# Patient Record
Sex: FEMALE | Race: BLACK OR AFRICAN AMERICAN | NOT HISPANIC OR LATINO | Employment: OTHER | URBAN - METROPOLITAN AREA
[De-identification: names, ages, dates, MRNs, and addresses within clinical notes are randomized per-mention and may not be internally consistent; named-entity substitution may affect disease eponyms.]

---

## 2017-03-22 ENCOUNTER — HOSPITAL ENCOUNTER (EMERGENCY)
Facility: HOSPITAL | Age: 77
Discharge: HOME | End: 2017-03-23
Attending: EMERGENCY MEDICINE
Payer: MEDICARE

## 2017-03-22 DIAGNOSIS — Z45.2 ENCOUNTER FOR ASSESSMENT OF PERIPHERALLY INSERTED CENTRAL VENOUS CATHETER (PICC): ICD-10-CM

## 2017-03-22 DIAGNOSIS — I50.9 CONGESTIVE HEART FAILURE, UNSPECIFIED CONGESTIVE HEART FAILURE CHRONICITY, UNSPECIFIED CONGESTIVE HEART FAILURE TYPE: Primary | ICD-10-CM

## 2017-03-22 DIAGNOSIS — Z45.2 NEEDS PERIPHERALLY INSERTED CENTRAL CATHETER (PICC): ICD-10-CM

## 2017-03-22 PROCEDURE — 99283 EMERGENCY DEPT VISIT LOW MDM: CPT | Mod: 25

## 2017-03-22 PROCEDURE — 36569 INSJ PICC 5 YR+ W/O IMAGING: CPT

## 2017-03-22 RX ORDER — BENZONATATE 100 MG/1
100 CAPSULE ORAL DAILY
COMMUNITY

## 2017-03-22 RX ORDER — ALLOPURINOL 100 MG/1
100 TABLET ORAL 2 TIMES DAILY
COMMUNITY

## 2017-03-22 RX ORDER — HYDROCODONE BITARTRATE AND ACETAMINOPHEN 7.5; 325 MG/1; MG/1
1 TABLET ORAL EVERY 6 HOURS PRN
Status: ON HOLD | COMMUNITY
End: 2017-05-27 | Stop reason: HOSPADM

## 2017-03-22 RX ORDER — BUMETANIDE 1 MG/1
2 TABLET ORAL 2 TIMES DAILY
COMMUNITY

## 2017-03-22 NOTE — ED AVS SNAPSHOT
[facility]  [address]  [place] [address]               Vita Field   3/22/2017 10:40 PM   ED    Description:  Female : 1940   Department:  [facility]            Your Care was Coordinated By:     Provider Role From To    Cruz Vang Jr., MD Attending Provider [id number] --      Reason for Visit     Vascular Access Problem           Diagnoses this Visit        Comments    Congestive heart failure, unspecified congestive heart failure chronicity, unspecified congestive heart failure type    -  Primary     Needs peripherally inserted central catheter (PICC)           ED Disposition     None           To Do List           Follow-up Information     Schedule an appointment as soon as possible for a visit with Tabatha Jacques MD.    Specialty:  Family Medicine    Why:  As needed    Contact information:    [address]   [address]  [facility] [address] [phone]        [facility]     [facility] -  Assistance  Registered nurses in the [facility] provide clinical advisement, health education, appointment booking, and other advisory services.  Call for this free service at [phone].             Medications           Message regarding Medications     Verify the changes and/or additions to your medication regime listed below are the same as discussed with your clinician today.  If any of these changes or additions are incorrect, please notify your healthcare provider.             Verify that the below list of medications is an accurate representation of the medications you are currently taking.  If none reported, the list may be blank. If incorrect, please contact your healthcare provider. Carry this list with you in case of emergency.           Current Medications     allopurinol (ZYLOPRIM) 100 MG tablet Take 100 mg by mouth 2 (two) times daily.    ascorbic acid (VITAMIN C) 500 MG tablet  Take 1 tablet by mouth Daily.    benzonatate (TESSALON) 100 MG capsule Take 100 mg by mouth once daily.    blood sugar diagnostic (BLOOD GLUCOSE TEST) Strp by Misc.(Non-Drug; Combo Route) route 3 (three) times daily. ONE TOUCH TRUE STRIPS    blood-glucose meter Misc Use to take blood sugar    bumetanide (BUMEX) 1 MG tablet Take 2 mg by mouth 2 (two) times daily.    calcium carbonate (OS-ALEX) 600 mg (1,500 mg) Tab Take 600 mg by mouth.    DEXILANT 60 mg capsule     hydrocodone-acetaminophen 7.5-325mg (NORCO) 7.5-325 mg per tablet Take 1 tablet by mouth every 6 (six) hours as needed for Pain.    iron-vitamin C (FE C) 100-250 mg Tab Take 1 tablet by mouth Daily.    lancets (ACCU-CHEK SOFTCLIX LANCETS) Misc Stick finger to apply blood to test strip to check blood sugar 3 times daily.    latanoprost 0.005 % ophthalmic solution INSTILL 1 DROP INTOP BOTH EYES EVERY NIGHT AT BEDTIME    leflunomide (ARAVA) 20 MG tablet Take 1 tablet by mouth Daily. generic    metoprolol succinate (TOPROL-XL) 25 MG 24 hr tablet Take 12.5 mg by mouth 2 (two) times daily.     multivitamin with minerals tablet Take 1 tablet by mouth.    rosuvastatin (CRESTOR) 10 MG tablet Take 1 tablet by mouth Daily.    TRUERESULT BLOOD GLUCOSE SYSTM kit     warfarin (COUMADIN) 5 MG tablet Take 2.5 tablets by mouth every evening.     ciprofloxacin HCl (CIPRO) 500 MG tablet     clindamycin (CLEOCIN) 300 MG capsule     fish oil-omega-3 fatty acids 300-1,000 mg capsule Take 1 g by mouth.    furosemide (LASIX) 40 MG tablet Take 1 tablet by mouth Daily.    hydrocodone-acetaminophen (LORTAB)  mg per tablet Take by mouth Twice daily.    hydrocodone-acetaminophen 5-325mg (NORCO) 5-325 mg per tablet     insulin detemir (LEVEMIR) 100 unit/mL injection Inject into the skin. 10-15 in the am    insulin detemir (LEVEMIR) 100 unit/mL injection Inject 24 Units into the skin.    insulin NPH-insulin regular (NOVOLIN 70/30) 100 unit/mL (70-30) injection Inject into the  "skin. 10-12 units over 400    insulin syringe-needle U-100 1 mL 31 x 5/16" Syrg     isosorbide-hydrALAZINE 20-37.5 mg (BIDIL) 20-37.5 mg Tab Take 1 tablet by mouth once daily.    KLOR-CON 10 10 mEq TbSR     tramadol (ULTRAM) 50 mg tablet     ZOVIRAX 5 % Crea            Clinical Reference Information           Your Vitals Were     BP Pulse Temp Resp Weight SpO2    118/65 (BP Location: Right arm, Patient Position: Sitting) 89 98.2 °F (36.8 °C) (Oral) 18 54.9 kg (121 lb) 95%    BMI                22.13 kg/m2          Allergies as of 3/22/2017        Reactions    Fish Containing Products Hives    Percocet [Oxycodone-acetaminophen] Hives, Rash    Shellfish Containing Products Hives    Darvocet A500  [Propoxyphene N-acetaminophen]     Other reaction(s): Rash    Ibuprofen Hives    Other reaction(s): Hives    Iodine     Other reaction(s): rash    Lanolin     Other reaction(s): rash    Latex Hives    Other reaction(s): rash    Other     fragrance    Saxagliptin Other (See Comments)    Lowers calcium level     Penicillins Rash    SEVERE FACIAL SWELLING, EYES SWELLED SHUT.    Propoxyphene-acetaminophen Rash    Red Dye Rash      Immunizations Administered on Date of Encounter - 3/22/2017     None      ED Micro, Lab, POCT     None      ED Imaging Orders     None        Discharge Instructions         Flushing Your PICC Line at Home  Your peripherally inserted central catheter (PICC) line is used to deliver medicine or feedings. Its a long, flexible tube (catheter) that goes into your vein. To care for your PICC line, you will need to flush it. This means youll need to clean it with a solution as directed by your healthcare provider. This keeps it from getting clogged or blocked. A clogged or blocked PICC line will need to be taken out and replaced.  When to flush your PICC line  Youll need to flush your PICC line as often as directed by your healthcare provider. You may need to flush it after each use. If the PICC line is not " in active use, you may need to flush it once a day. Or you may only need to flush it once a week. Talk with your healthcare provider about how often you should do this.  What youll need  · Flushing solution. This is the liquid that you will send through the PICC line. Your healthcare provider will tell you what kind to use. In most cases, it is saline solution. This is a sterile mix of water and a tiny amount of salt. Your healthcare provider will also tell you how much to use. You may also need to flush with a heparin solution after the saline. Heparin is a medicine that thins the blood. It helps prevent blood from clotting in and around the catheter.  · A syringe. This is the device used to give an injection, or shot. A syringe is used to flush your PICC line with the solution. You will probably use prefilled syringes.  · Alcohol wipes or rubbing alcohol and cotton balls. Youll use these to clean some of the tools used to flush your line. This helps to prevent germs from going into your PICC line.  How to flush your PICC line  Repeat these steps as often as your healthcare provider has instructed:  Step 1. Wash your hands  · Wash your hands well with soap and warm water.  · If you dont have access to soap and water, use an alcohol-based hand gel. The gel should have at least 60% alcohol.  · Only touch your PICC line with clean hands, or when wearing clean gloves. This is to protect you from infection.  Step 2. Fill the syringe  · Open a new bottle of the flushing solution. If youre using a bottle thats already open, use the alcohol to clean the top of the bottle.  · Remove the cap from the needle or tip of the syringe. Push the plunger of the syringe down all the way.  · Put the needle or tip of the syringe into the flushing solution.  · Pull the syringe plunger out. Stop when you have the right amount of flushing solution in the syringe. Your healthcare provider will tell you how much to use.  Step 3. Remove  air from the syringe  · Hold the syringe with the tip pointing up.  · Flick or tap the syringe with your finger. This will cause any large air bubbles to rise into the tip.  · Slowly push on the plunger until a tiny drop of flushing solution comes out of the needle or tip.  · Put the cap back on the needle or tip of the syringe. This will keep it germ-free until you use it.  Step 4. Inject the flushing solution  · Wipe the port with alcohol.  · Open the clamp, if there is one.  · Take the cap off the needle or tip of the syringe. Insert the needle or tip into the port.  · Push the plunger in slowly and smoothly. Dont force the plunger. You shouldnt feel any pressure when you push the fluid into the PICC line. If you do, stop right away.  Step 5. Finish flushing  · If there is a clamp, close it just before the syringe is empty. This stops blood from flowing back into the catheter.  · Remove the needle or tip of the syringe from the port.  · Put the syringe into a special container (sharps container).  When to call your healthcare provider  Call your healthcare provider right away if you have:  · Fever of 100.4°F (38°C) or higher  · Swelling, redness, drainage, or pain around the PICC site  · Bleeding from the PICC site  · Tubing that leaks or is pulling out  · Feel new resistance when flushing the PICC line, or can't flush at all  · Medicine or fluids that do not drain from the bag into your PICC   Date Last Reviewed: 7/1/2016  © 4050-1496 [employer] [address] [place] [address], [place] [address]. All rights reserved. This information is not intended as a substitute for professional medical care. Always follow your healthcare professional's instructions.          [facility] Sign-Up     Activating your [facility] account is as easy as 1-2-3!     1) Visit [email or web address], select Sign Up Now, enter this activation code and your date of birth, then select Next.  [email or web address]  Expires: 5/6/2017 11:58 PM       2) Create a username and password to use when you visit [facility] in the future and select a security question in case you lose your password and select Next.    3) Enter your e-mail address and click Sign Up!    Additional Information  If you have questions, please e-mail [email or web address] or call [phone] to talk to our [email or web address] staff. Remember, [employer] [facility] is NOT to be used for urgent needs. For medical emergencies, dial 911.          [facility]  complies with applicable Federal civil rights laws and does not discriminate on the basis of race, color, national origin, age, disability, or sex.        Language Assistance Services     ATTENTION: Language assistance services are available, free of charge. Please call [phone].      ATENCIÓN: Si habla español, tiene a jarquin disposición servicios gratuitos de asistencia lingüística. Llame al [phone].     CHÚ Ý: N?u b?n nói Ti?ng Vi?t, có các d?ch v? h? tr? ngôn ng? mi?n phí dành cho b?n. G?i s? [phone].

## 2017-03-23 VITALS
RESPIRATION RATE: 18 BRPM | WEIGHT: 121 LBS | HEART RATE: 87 BPM | BODY MASS INDEX: 22.13 KG/M2 | OXYGEN SATURATION: 98 % | TEMPERATURE: 98 F | SYSTOLIC BLOOD PRESSURE: 109 MMHG | DIASTOLIC BLOOD PRESSURE: 61 MMHG

## 2017-03-23 PROCEDURE — 36569 INSJ PICC 5 YR+ W/O IMAGING: CPT

## 2017-03-23 NOTE — ED PROVIDER NOTES
SCRIBE #1 NOTE: I, Filiberto Mtz, am scribing for, and in the presence of, Cruz Vang Jr., MD. I have scribed the entire note.      History      Chief Complaint   Patient presents with    Vascular Access Problem     pt pulled PICC line out around 7pm; pt on Dobutamine        Review of patient's allergies indicates:   Allergen Reactions    Fish containing products Hives    Percocet [oxycodone-acetaminophen] Hives and Rash    Shellfish containing products Hives    Darvocet a500  [propoxyphene n-acetaminophen]      Other reaction(s): Rash    Ibuprofen Hives     Other reaction(s): Hives    Iodine      Other reaction(s): rash    Lanolin      Other reaction(s): rash    Latex Hives     Other reaction(s): rash    Other      fragrance    Saxagliptin Other (See Comments)     Lowers calcium level     Penicillins Rash     SEVERE FACIAL SWELLING, EYES SWELLED SHUT.    Propoxyphene-acetaminophen Rash    Red dye Rash        HPI   HPI    3/22/2017, 11:01 PM   History obtained from the patient      History of Present Illness: Vita Field is a 76 y.o. female patient who presents to the Emergency Department for an evaluation of a vascular access problem which onset suddenly today. Symptoms are constant and moderate in severity. Exacerbated by nothing and relieved by nothing. Patient denies any fever chills, congestion, sore throat, HA, dizziness, weakness, lightheadedness, numbness, abd pain, nausea, vomiting, SOB, CP, dysuria, hematuria, back pain, neck pain, and all other sxs at this time. Pt reportedly pulled out her PICC line around 1900 today while drying off after a shower. Pt is reportedly on dobutamine. No further complaints or concerns at this time.     Arrival mode: Personal vehicle    PCP: Tabatha Jacques MD       Past Medical History:  Past Medical History:   Diagnosis Date    Arthritis     Cataract     CHF (congestive heart failure)     Diabetes mellitus     Diabetic retinopathy     Glaucoma      H/O blood clots     Heart disease     Hypertension     Kidney disease     Osteoporosis     Rheumatoid arthritis     Rheumatoid arthritis     Vertigo        Past Surgical History:  Past Surgical History:   Procedure Laterality Date    CARDIAC PACEMAKER PLACEMENT      CATARACT EXTRACTION      CATARACT EXTRACTION W/  INTRAOCULAR LENS IMPLANT Left 12-29-14    Canaloplasty    HYSTERECTOMY      TOE AMPUTATION Right     TOTAL KNEE ARTHROPLASTY           Family History:  Family History   Problem Relation Age of Onset    Diabetes Mother     Diabetes Sister     Glaucoma Sister     Diabetes Brother     Glaucoma Brother     Blindness Brother        Social History:  Social History     Social History Main Topics    Smoking status: Never Smoker    Smokeless tobacco: Never Used    Alcohol use Unknown    Drug use: Unknown    Sexual activity: Unknown       ROS   Review of Systems   Constitutional: Negative for chills and fever.   HENT: Negative for congestion, sore throat and trouble swallowing.    Respiratory: Negative for shortness of breath.    Cardiovascular: Negative for chest pain.   Gastrointestinal: Negative for abdominal pain, nausea and vomiting.   Genitourinary: Negative for dysuria and hematuria.   Musculoskeletal: Negative for back pain and neck pain.   Skin: Negative for rash.   Neurological: Negative for dizziness, weakness, light-headedness, numbness and headaches.   Hematological: Does not bruise/bleed easily.       Physical Exam    Initial Vitals   BP Pulse Resp Temp SpO2   03/22/17 2238 03/22/17 2238 03/22/17 2238 03/22/17 2238 03/22/17 2238   118/65 89 18 98.2 °F (36.8 °C) 95 %      Physical Exam  Nursing Notes and Vital Signs Reviewed.  Constitutional: Patient is in no acute distress. Awake and alert. Well-developed and well-nourished.  Head: Atraumatic. Normocephalic.  Eyes: PERRL. EOM intact. Conjunctivae are not pale. No scleral icterus.  ENT: Mucous membranes are moist. Oropharynx  is clear and symmetric.    Neck: Supple. Full ROM. No lymphadenopathy.  Cardiovascular: Regular rate. Regular rhythm. No murmurs, rubs, or gallops. Distal pulses are 2+ and symmetric.  Pulmonary/Chest: No respiratory distress. Clear to auscultation bilaterally. No wheezing, rales, or rhonchi.  Abdominal: Soft and non-distended.  There is no tenderness.  No rebound, guarding, or rigidity. Good bowel sounds.  Genitourinary: No CVA tenderness  Musculoskeletal: Moves all extremities. No obvious deformities. No edema. No calf tenderness. Dressing noted on left arm where previous gas cath was located.   Skin: Warm and dry.  Neurological:  Alert, awake, and appropriate.  Normal speech.  No acute focal neurological deficits are appreciated.  Psychiatric: Normal affect. Good eye contact. Appropriate in content.    ED Course    Procedures  ED Vital Signs:  Vitals:    03/22/17 2238   BP: 118/65   Pulse: 89   Resp: 18   Temp: 98.2 °F (36.8 °C)   TempSrc: Oral   SpO2: 95%   Weight: 54.9 kg (121 lb)              The Emergency Provider reviewed the vital signs and test results, which are outlined above.    ED Discussion       12:04 PM: Reassessed pt at this time. Pt is awake, alert, and in NAD. Pt states her condition has improved at this time. Discussed with pt all pertinent ED information. Discussed pt dx of CHF and plan of tx. Gave pt all f/u and return to the ED instructions. All questions and concerns were addressed at this time. Pt expresses understanding of information and instructions, and is comfortable with plan to discharge. Pt is stable for discharge.    I discussed with patient and/or family/caretaker that evaluation in the ED does not suggest any emergent or life threatening medical conditions requiring immediate intervention beyond what was provided in the ED, and I believe patient is safe for discharge.  Regardless, an unremarkable evaluation in the ED does not preclude the development or presence of a serious of  life threatening condition. As such, patient was instructed to return immediately for any worsening or change in current symptoms.   100 AM- PICC line nurse came in and replaced PICC line wihtout any difficulty - pt discharged after xray dine to verify line placement and , xray reviewed by me     ED Medication(s):  Medications - No data to display    New Prescriptions    No medications on file       Follow-up Information     Schedule an appointment as soon as possible for a visit with Tabatha Jacques MD.    Specialty:  Family Medicine    Why:  As needed    Contact information:    [address]   [address]  [place] [address] [phone]              Medical Decision Making              Scribe Attestation:   Scribe #1: I performed the above scribed service and the documentation accurately describes the services I performed. I attest to the accuracy of the note.    Attending:   Physician Attestation Statement for Scribe #1: I, Cruz Vang Jr., MD, personally performed the services described in this documentation, as scribed by Filiberto Mtz, in my presence, and it is both accurate and complete.          Clinical Impression       ICD-10-CM ICD-9-CM   1. Congestive heart failure, unspecified congestive heart failure chronicity, unspecified congestive heart failure type I50.9 428.0   2. Needs peripherally inserted central catheter (PICC) Z45.2 V58.81       Disposition:   Disposition: Discharged  Condition: Stable         Cruz Vang Jr., MD  03/23/17 0102

## 2017-03-23 NOTE — PROCEDURES
Vita Field is a 76 y.o. female patient.    Temp: 98.1 °F (36.7 °C) (03/23/17 0040)  Pulse: 89 (03/23/17 0040)  Resp: 18 (03/23/17 0040)  BP: (!) 107/57 (03/23/17 0040)  SpO2: 99 % (03/23/17 0040)  Weight: 54.9 kg (121 lb) (03/22/17 2238)    PICC  Date/Time: 3/23/2017 12:45 AM  Location procedure was performed: [facility] PICC LINE PLACEMENT  Performed by: JASMIN MARI  Consent Done: Yes  Time out: Immediately prior to procedure a time out was called to verify the correct patient, procedure, equipment, support staff and site/side marked as required  Indications: med administration  Anesthesia: local infiltration  Local anesthetic: lidocaine 1% without epinephrine  Anesthetic Total (mL): 5  Preparation: skin prepped with ChloraPrep  Skin prep agent dried: skin prep agent completely dried prior to procedure  Sterile barriers: all five maximum sterile barriers used - cap, mask, sterile gown, sterile gloves, and large sterile sheet  Hand hygiene: hand hygiene performed prior to central venous catheter insertion  Location details: right basilic  Catheter type: double lumen  Catheter size: 5 Fr  Catheter Length: 35cm    Ultrasound guidance: yes  Vessel Caliber: medium and patent, compressibility normal  Vascular Doppler: not done  Needle advanced into vessel with real time Ultrasound guidance.  Guidewire confirmed in vessel.  Sterile sheath used.  no esophageal manometryNumber of attempts: 1  Post-procedure: blood return through all ports, chlorhexidine patch and sterile dressing applied  Estimated blood loss (mL): 0  Specimens: No  Implants: No  Assessment: placement verified by x-ray and successful placement  Complications: none        Jasmin Mari  3/23/2017

## 2017-03-23 NOTE — DISCHARGE INSTRUCTIONS
Flushing Your PICC Line at Home  Your peripherally inserted central catheter (PICC) line is used to deliver medicine or feedings. Its a long, flexible tube (catheter) that goes into your vein. To care for your PICC line, you will need to flush it. This means youll need to clean it with a solution as directed by your healthcare provider. This keeps it from getting clogged or blocked. A clogged or blocked PICC line will need to be taken out and replaced.  When to flush your PICC line  Youll need to flush your PICC line as often as directed by your healthcare provider. You may need to flush it after each use. If the PICC line is not in active use, you may need to flush it once a day. Or you may only need to flush it once a week. Talk with your healthcare provider about how often you should do this.  What youll need  · Flushing solution. This is the liquid that you will send through the PICC line. Your healthcare provider will tell you what kind to use. In most cases, it is saline solution. This is a sterile mix of water and a tiny amount of salt. Your healthcare provider will also tell you how much to use. You may also need to flush with a heparin solution after the saline. Heparin is a medicine that thins the blood. It helps prevent blood from clotting in and around the catheter.  · A syringe. This is the device used to give an injection, or shot. A syringe is used to flush your PICC line with the solution. You will probably use prefilled syringes.  · Alcohol wipes or rubbing alcohol and cotton balls. Youll use these to clean some of the tools used to flush your line. This helps to prevent germs from going into your PICC line.  How to flush your PICC line  Repeat these steps as often as your healthcare provider has instructed:  Step 1. Wash your hands  · Wash your hands well with soap and warm water.  · If you dont have access to soap and water, use an alcohol-based hand gel. The gel should have at least 60%  alcohol.  · Only touch your PICC line with clean hands, or when wearing clean gloves. This is to protect you from infection.  Step 2. Fill the syringe  · Open a new bottle of the flushing solution. If youre using a bottle thats already open, use the alcohol to clean the top of the bottle.  · Remove the cap from the needle or tip of the syringe. Push the plunger of the syringe down all the way.  · Put the needle or tip of the syringe into the flushing solution.  · Pull the syringe plunger out. Stop when you have the right amount of flushing solution in the syringe. Your healthcare provider will tell you how much to use.  Step 3. Remove air from the syringe  · Hold the syringe with the tip pointing up.  · Flick or tap the syringe with your finger. This will cause any large air bubbles to rise into the tip.  · Slowly push on the plunger until a tiny drop of flushing solution comes out of the needle or tip.  · Put the cap back on the needle or tip of the syringe. This will keep it germ-free until you use it.  Step 4. Inject the flushing solution  · Wipe the port with alcohol.  · Open the clamp, if there is one.  · Take the cap off the needle or tip of the syringe. Insert the needle or tip into the port.  · Push the plunger in slowly and smoothly. Dont force the plunger. You shouldnt feel any pressure when you push the fluid into the PICC line. If you do, stop right away.  Step 5. Finish flushing  · If there is a clamp, close it just before the syringe is empty. This stops blood from flowing back into the catheter.  · Remove the needle or tip of the syringe from the port.  · Put the syringe into a special container (sharps container).  When to call your healthcare provider  Call your healthcare provider right away if you have:  · Fever of 100.4°F (38°C) or higher  · Swelling, redness, drainage, or pain around the PICC site  · Bleeding from the PICC site  · Tubing that leaks or is pulling out  · Feel new resistance  when flushing the PICC line, or can't flush at all  · Medicine or fluids that do not drain from the bag into your PICC   Date Last Reviewed: 7/1/2016  © 6439-5958 The "[employer]. [address] [place] [address], [place] [address]. All rights reserved. This information is not intended as a substitute for professional medical care. Always follow your healthcare professional's instructions.

## 2017-05-24 ENCOUNTER — HOSPITAL ENCOUNTER (INPATIENT)
Facility: HOSPITAL | Age: 77
LOS: 3 days | Discharge: HOME HEALTH | DRG: 291 | End: 2017-05-27
Attending: EMERGENCY MEDICINE | Admitting: INTERNAL MEDICINE
Payer: MEDICARE

## 2017-05-24 DIAGNOSIS — D64.9 ANEMIA, UNSPECIFIED TYPE: ICD-10-CM

## 2017-05-24 DIAGNOSIS — N28.9 RENAL INSUFFICIENCY: ICD-10-CM

## 2017-05-24 DIAGNOSIS — I50.9 CHF, ACUTE ON CHRONIC: ICD-10-CM

## 2017-05-24 DIAGNOSIS — R07.9 CHEST PAIN, UNSPECIFIED TYPE: ICD-10-CM

## 2017-05-24 DIAGNOSIS — I50.9 ACUTE ON CHRONIC CONGESTIVE HEART FAILURE, UNSPECIFIED CONGESTIVE HEART FAILURE TYPE: Primary | ICD-10-CM

## 2017-05-24 LAB
ALBUMIN SERPL BCP-MCNC: 3 G/DL
ALP SERPL-CCNC: 87 U/L
ALT SERPL W/O P-5'-P-CCNC: 15 U/L
ANION GAP SERPL CALC-SCNC: 12 MMOL/L
AST SERPL-CCNC: 44 U/L
BASOPHILS # BLD AUTO: 0.01 K/UL
BASOPHILS NFR BLD: 0.2 %
BILIRUB SERPL-MCNC: 0.9 MG/DL
BNP SERPL-MCNC: 4618 PG/ML
BUN SERPL-MCNC: 62 MG/DL
CALCIUM SERPL-MCNC: 8.6 MG/DL
CHLORIDE SERPL-SCNC: 103 MMOL/L
CO2 SERPL-SCNC: 25 MMOL/L
CREAT SERPL-MCNC: 2.2 MG/DL
DIFFERENTIAL METHOD: ABNORMAL
EOSINOPHIL # BLD AUTO: 0.1 K/UL
EOSINOPHIL NFR BLD: 3.2 %
ERYTHROCYTE [DISTWIDTH] IN BLOOD BY AUTOMATED COUNT: 25.2 %
EST. GFR  (AFRICAN AMERICAN): 24 ML/MIN/1.73 M^2
EST. GFR  (NON AFRICAN AMERICAN): 21 ML/MIN/1.73 M^2
GLUCOSE SERPL-MCNC: 153 MG/DL
HCT VFR BLD AUTO: 24.2 %
HGB BLD-MCNC: 8 G/DL
INR PPP: 5.1
LYMPHOCYTES # BLD AUTO: 1 K/UL
LYMPHOCYTES NFR BLD: 22.7 %
MCH RBC QN AUTO: 26.1 PG
MCHC RBC AUTO-ENTMCNC: 33.1 %
MCV RBC AUTO: 79 FL
MONOCYTES # BLD AUTO: 1.1 K/UL
MONOCYTES NFR BLD: 24.5 %
NEUTROPHILS # BLD AUTO: 2.2 K/UL
NEUTROPHILS NFR BLD: 49.4 %
PLATELET # BLD AUTO: 156 K/UL
PMV BLD AUTO: ABNORMAL FL
POTASSIUM SERPL-SCNC: 3.8 MMOL/L
PROT SERPL-MCNC: 6.6 G/DL
PROTHROMBIN TIME: 49.6 SEC
RBC # BLD AUTO: 3.07 M/UL
SODIUM SERPL-SCNC: 140 MMOL/L
TROPONIN I SERPL DL<=0.01 NG/ML-MCNC: 0.06 NG/ML
WBC # BLD AUTO: 4.37 K/UL

## 2017-05-24 PROCEDURE — 99285 EMERGENCY DEPT VISIT HI MDM: CPT

## 2017-05-24 PROCEDURE — 63600175 PHARM REV CODE 636 W HCPCS: Performed by: EMERGENCY MEDICINE

## 2017-05-24 PROCEDURE — 83880 ASSAY OF NATRIURETIC PEPTIDE: CPT

## 2017-05-24 PROCEDURE — 80053 COMPREHEN METABOLIC PANEL: CPT

## 2017-05-24 PROCEDURE — 84484 ASSAY OF TROPONIN QUANT: CPT

## 2017-05-24 PROCEDURE — 93010 ELECTROCARDIOGRAM REPORT: CPT | Mod: ,,, | Performed by: INTERNAL MEDICINE

## 2017-05-24 PROCEDURE — 85025 COMPLETE CBC W/AUTO DIFF WBC: CPT

## 2017-05-24 PROCEDURE — 11000001 HC ACUTE MED/SURG PRIVATE ROOM

## 2017-05-24 PROCEDURE — 85610 PROTHROMBIN TIME: CPT

## 2017-05-24 PROCEDURE — 82962 GLUCOSE BLOOD TEST: CPT

## 2017-05-24 PROCEDURE — 93005 ELECTROCARDIOGRAM TRACING: CPT

## 2017-05-24 RX ORDER — FUROSEMIDE 10 MG/ML
40 INJECTION INTRAMUSCULAR; INTRAVENOUS
Status: COMPLETED | OUTPATIENT
Start: 2017-05-24 | End: 2017-05-24

## 2017-05-24 RX ORDER — ATORVASTATIN CALCIUM 10 MG/1
10 TABLET, FILM COATED ORAL DAILY
Status: ON HOLD | COMMUNITY
End: 2017-05-27 | Stop reason: HOSPADM

## 2017-05-24 RX ADMIN — FUROSEMIDE 40 MG: 10 INJECTION, SOLUTION INTRAMUSCULAR; INTRAVENOUS at 10:05

## 2017-05-25 PROBLEM — T45.511A COUMADIN TOXICITY: Status: ACTIVE | Noted: 2017-05-25

## 2017-05-25 PROBLEM — N18.5 CKD (CHRONIC KIDNEY DISEASE), STAGE V: Status: ACTIVE | Noted: 2017-05-25

## 2017-05-25 PROBLEM — D64.9 SYMPTOMATIC ANEMIA: Status: ACTIVE | Noted: 2017-05-25

## 2017-05-25 LAB
ACANTHOCYTES BLD QL SMEAR: PRESENT
ALBUMIN SERPL BCP-MCNC: 2.8 G/DL
ALP SERPL-CCNC: 91 U/L
ALT SERPL W/O P-5'-P-CCNC: 11 U/L
ANION GAP SERPL CALC-SCNC: 9 MMOL/L
ANISOCYTOSIS BLD QL SMEAR: ABNORMAL
AST SERPL-CCNC: 36 U/L
BASOPHILS # BLD AUTO: 0.01 K/UL
BASOPHILS NFR BLD: 0.2 %
BILIRUB SERPL-MCNC: 0.8 MG/DL
BUN SERPL-MCNC: 61 MG/DL
BURR CELLS BLD QL SMEAR: ABNORMAL
CALCIUM SERPL-MCNC: 8.4 MG/DL
CHLORIDE SERPL-SCNC: 105 MMOL/L
CO2 SERPL-SCNC: 25 MMOL/L
CREAT SERPL-MCNC: 2.1 MG/DL
DACRYOCYTES BLD QL SMEAR: ABNORMAL
DIFFERENTIAL METHOD: ABNORMAL
EOSINOPHIL # BLD AUTO: 0.2 K/UL
EOSINOPHIL NFR BLD: 3.6 %
ERYTHROCYTE [DISTWIDTH] IN BLOOD BY AUTOMATED COUNT: 25.7 %
EST. GFR  (AFRICAN AMERICAN): 26 ML/MIN/1.73 M^2
EST. GFR  (NON AFRICAN AMERICAN): 22 ML/MIN/1.73 M^2
ESTIMATED PA SYSTOLIC PRESSURE: 80.93
GLUCOSE SERPL-MCNC: 235 MG/DL
HCT VFR BLD AUTO: 21.5 %
HGB BLD-MCNC: 7.1 G/DL
INR PPP: 5.3
LYMPHOCYTES # BLD AUTO: 0.9 K/UL
LYMPHOCYTES NFR BLD: 20.8 %
MAGNESIUM SERPL-MCNC: 1.9 MG/DL
MCH RBC QN AUTO: 26 PG
MCHC RBC AUTO-ENTMCNC: 33 %
MCV RBC AUTO: 79 FL
MONOCYTES # BLD AUTO: 0.9 K/UL
MONOCYTES NFR BLD: 21 %
NEUTROPHILS # BLD AUTO: 2.4 K/UL
NEUTROPHILS NFR BLD: 54.6 %
PHOSPHATE SERPL-MCNC: 3.2 MG/DL
PLATELET # BLD AUTO: 168 K/UL
PMV BLD AUTO: ABNORMAL FL
POCT GLUCOSE: 107 MG/DL (ref 70–110)
POCT GLUCOSE: 230 MG/DL (ref 70–110)
POCT GLUCOSE: 252 MG/DL (ref 70–110)
POIKILOCYTOSIS BLD QL SMEAR: ABNORMAL
POTASSIUM SERPL-SCNC: 3.3 MMOL/L
PROT SERPL-MCNC: 6.1 G/DL
PROTHROMBIN TIME: 51.5 SEC
RBC # BLD AUTO: 2.73 M/UL
RETIRED EF AND QEF - SEE NOTES: 20 (ref 55–65)
SCHISTOCYTES BLD QL SMEAR: PRESENT
SODIUM SERPL-SCNC: 139 MMOL/L
SPHEROCYTES BLD QL SMEAR: ABNORMAL
STOMATOCYTES BLD QL SMEAR: PRESENT
TARGETS BLD QL SMEAR: ABNORMAL
TRICUSPID VALVE REGURGITATION: ABNORMAL
TROPONIN I SERPL DL<=0.01 NG/ML-MCNC: 0.05 NG/ML
TROPONIN I SERPL DL<=0.01 NG/ML-MCNC: 0.05 NG/ML
TROPONIN I SERPL DL<=0.01 NG/ML-MCNC: 0.06 NG/ML
WBC # BLD AUTO: 4.42 K/UL

## 2017-05-25 PROCEDURE — 63600175 PHARM REV CODE 636 W HCPCS: Performed by: NURSE PRACTITIONER

## 2017-05-25 PROCEDURE — 84484 ASSAY OF TROPONIN QUANT: CPT

## 2017-05-25 PROCEDURE — 25000003 PHARM REV CODE 250

## 2017-05-25 PROCEDURE — 25000003 PHARM REV CODE 250: Performed by: INTERNAL MEDICINE

## 2017-05-25 PROCEDURE — 93306 TTE W/DOPPLER COMPLETE: CPT | Mod: 26,,, | Performed by: INTERNAL MEDICINE

## 2017-05-25 PROCEDURE — 93306 TTE W/DOPPLER COMPLETE: CPT

## 2017-05-25 PROCEDURE — 36415 COLL VENOUS BLD VENIPUNCTURE: CPT

## 2017-05-25 PROCEDURE — 80053 COMPREHEN METABOLIC PANEL: CPT

## 2017-05-25 PROCEDURE — 84484 ASSAY OF TROPONIN QUANT: CPT | Mod: 91

## 2017-05-25 PROCEDURE — 25000003 PHARM REV CODE 250: Performed by: EMERGENCY MEDICINE

## 2017-05-25 PROCEDURE — 85025 COMPLETE CBC W/AUTO DIFF WBC: CPT

## 2017-05-25 PROCEDURE — 63600175 PHARM REV CODE 636 W HCPCS: Performed by: EMERGENCY MEDICINE

## 2017-05-25 PROCEDURE — 84100 ASSAY OF PHOSPHORUS: CPT

## 2017-05-25 PROCEDURE — 83735 ASSAY OF MAGNESIUM: CPT

## 2017-05-25 PROCEDURE — 85610 PROTHROMBIN TIME: CPT

## 2017-05-25 PROCEDURE — 21400001 HC TELEMETRY ROOM

## 2017-05-25 PROCEDURE — 63600175 PHARM REV CODE 636 W HCPCS: Performed by: INTERNAL MEDICINE

## 2017-05-25 RX ORDER — PANTOPRAZOLE SODIUM 40 MG/1
40 TABLET, DELAYED RELEASE ORAL DAILY
Status: DISCONTINUED | OUTPATIENT
Start: 2017-05-25 | End: 2017-05-27 | Stop reason: HOSPADM

## 2017-05-25 RX ORDER — LEFLUNOMIDE 20 MG/1
20 TABLET ORAL DAILY
Status: DISCONTINUED | OUTPATIENT
Start: 2017-05-25 | End: 2017-05-27 | Stop reason: HOSPADM

## 2017-05-25 RX ORDER — ONDANSETRON 2 MG/ML
4 INJECTION INTRAMUSCULAR; INTRAVENOUS EVERY 8 HOURS PRN
Status: DISCONTINUED | OUTPATIENT
Start: 2017-05-25 | End: 2017-05-27 | Stop reason: HOSPADM

## 2017-05-25 RX ORDER — IPRATROPIUM BROMIDE AND ALBUTEROL SULFATE 2.5; .5 MG/3ML; MG/3ML
3 SOLUTION RESPIRATORY (INHALATION) EVERY 4 HOURS PRN
Status: DISCONTINUED | OUTPATIENT
Start: 2017-05-25 | End: 2017-05-27 | Stop reason: HOSPADM

## 2017-05-25 RX ORDER — FUROSEMIDE 10 MG/ML
40 INJECTION INTRAMUSCULAR; INTRAVENOUS 2 TIMES DAILY
Status: DISCONTINUED | OUTPATIENT
Start: 2017-05-25 | End: 2017-05-27 | Stop reason: HOSPADM

## 2017-05-25 RX ORDER — LATANOPROST 50 UG/ML
1 SOLUTION/ DROPS OPHTHALMIC NIGHTLY
Status: DISCONTINUED | OUTPATIENT
Start: 2017-05-26 | End: 2017-05-27 | Stop reason: HOSPADM

## 2017-05-25 RX ORDER — SODIUM CHLORIDE 9 MG/ML
3 INJECTION, SOLUTION INTRAMUSCULAR; INTRAVENOUS; SUBCUTANEOUS EVERY 8 HOURS
Status: DISCONTINUED | OUTPATIENT
Start: 2017-05-25 | End: 2017-05-27 | Stop reason: HOSPADM

## 2017-05-25 RX ORDER — ATORVASTATIN CALCIUM 10 MG/1
10 TABLET, FILM COATED ORAL DAILY
Status: DISCONTINUED | OUTPATIENT
Start: 2017-05-25 | End: 2017-05-27 | Stop reason: HOSPADM

## 2017-05-25 RX ORDER — DOBUTAMINE HYDROCHLORIDE 400 MG/100ML
320 INJECTION, SOLUTION INTRAVENOUS CONTINUOUS
Status: DISCONTINUED | OUTPATIENT
Start: 2017-05-25 | End: 2017-05-27 | Stop reason: HOSPADM

## 2017-05-25 RX ORDER — DEXTROSE 50 % IN WATER (D50W) INTRAVENOUS SYRINGE
Status: COMPLETED
Start: 2017-05-25 | End: 2017-05-25

## 2017-05-25 RX ORDER — MAG HYDROX/ALUMINUM HYD/SIMETH 200-200-20
30 SUSPENSION, ORAL (FINAL DOSE FORM) ORAL EVERY 6 HOURS PRN
Status: DISCONTINUED | OUTPATIENT
Start: 2017-05-25 | End: 2017-05-27 | Stop reason: HOSPADM

## 2017-05-25 RX ORDER — HYDRALAZINE HYDROCHLORIDE 20 MG/ML
10 INJECTION INTRAMUSCULAR; INTRAVENOUS EVERY 8 HOURS PRN
Status: DISCONTINUED | OUTPATIENT
Start: 2017-05-25 | End: 2017-05-27 | Stop reason: HOSPADM

## 2017-05-25 RX ORDER — DOBUTAMINE HYDROCHLORIDE 100 MG/100ML
250 INJECTION INTRAVENOUS
Status: ON HOLD | COMMUNITY
End: 2017-05-27 | Stop reason: HOSPADM

## 2017-05-25 RX ORDER — HEPARIN SODIUM 5000 [USP'U]/ML
5000 INJECTION, SOLUTION INTRAVENOUS; SUBCUTANEOUS EVERY 8 HOURS
Status: DISCONTINUED | OUTPATIENT
Start: 2017-05-25 | End: 2017-05-25

## 2017-05-25 RX ADMIN — SODIUM CHLORIDE, PRESERVATIVE FREE 3 ML: 5 INJECTION INTRAVENOUS at 10:05

## 2017-05-25 RX ADMIN — FUROSEMIDE 40 MG: 10 INJECTION, SOLUTION INTRAMUSCULAR; INTRAVENOUS at 05:05

## 2017-05-25 RX ADMIN — PANTOPRAZOLE SODIUM 40 MG: 40 TABLET, DELAYED RELEASE ORAL at 09:05

## 2017-05-25 RX ADMIN — INSULIN DETEMIR 20 UNITS: 100 INJECTION, SOLUTION SUBCUTANEOUS at 09:05

## 2017-05-25 RX ADMIN — SODIUM CHLORIDE, PRESERVATIVE FREE 3 ML: 5 INJECTION INTRAVENOUS at 02:05

## 2017-05-25 RX ADMIN — SODIUM CHLORIDE, PRESERVATIVE FREE 3 ML: 5 INJECTION INTRAVENOUS at 06:05

## 2017-05-25 RX ADMIN — METOPROLOL SUCCINATE 12.5 MG: 25 TABLET, EXTENDED RELEASE ORAL at 09:05

## 2017-05-25 RX ADMIN — ATORVASTATIN CALCIUM 10 MG: 10 TABLET, FILM COATED ORAL at 09:05

## 2017-05-25 RX ADMIN — LEFLUNOMIDE 20 MG: 20 TABLET, FILM COATED ORAL at 09:05

## 2017-05-25 RX ADMIN — FUROSEMIDE 40 MG: 10 INJECTION, SOLUTION INTRAMUSCULAR; INTRAVENOUS at 09:05

## 2017-05-25 RX ADMIN — DOBUTAMINE IN DEXTROSE 320 MCG/MIN: 200 INJECTION, SOLUTION INTRAVENOUS at 03:05

## 2017-05-25 RX ADMIN — DEXTROSE MONOHYDRATE 50 ML: 25 INJECTION, SOLUTION INTRAVENOUS at 08:05

## 2017-05-25 NOTE — ED PROVIDER NOTES
"SCRIBE #1 NOTE: I, Corinne Mack, am scribing for, and in the presence of, Jose Enrique Ambrosio MD. I have scribed the entire note.      History      Chief Complaint   Patient presents with    Shortness of Breath     sob x 1 hr. HX of CHF, Resolved with 2 L of O2 via NC       Review of patient's allergies indicates:   Allergen Reactions    Fish containing products Hives    Percocet [oxycodone-acetaminophen] Hives and Rash    Shellfish containing products Hives    Darvocet a500  [propoxyphene n-acetaminophen]      Other reaction(s): Rash    Ibuprofen Hives     Other reaction(s): Hives    Iodine      Other reaction(s): rash    Lanolin      Other reaction(s): rash    Latex Hives     Other reaction(s): rash    Other      fragrance    Saxagliptin Other (See Comments)     Lowers calcium level     Penicillins Rash     SEVERE FACIAL SWELLING, EYES SWELLED SHUT.    Propoxyphene-acetaminophen Rash    Red dye Rash        HPI   HPI    5/24/2017, 10:05 PM   History obtained from the patient      History of Present Illness: Vita Field is a 77 y.o. female patient who presents to the Emergency Department for SOB which onset gradually 1 hour PTA. Symptoms are constant and moderate in severity. Pt reports gaining "6 lbs" over the weekend. Movement exacerbates sxs. No mitigating factors reported. Associated sxs include cough. Patient denies any fever, chills, CP, N/V/D, back pain, HA, dizziness, and all other sxs at this time. Prior Tx includes oxygen given en route with improvement. Pt has hx of CHF. No further complaints or concerns at this time.         Arrival mode:  EMS      PCP: Tabatha Jacques MD       Past Medical History:  Past Medical History:   Diagnosis Date    Arthritis     Cataract     CHF (congestive heart failure)     Diabetes mellitus     Diabetic retinopathy     Glaucoma     H/O blood clots     Heart disease     Hypertension     Kidney disease     Osteoporosis     Rheumatoid arthritis     " Rheumatoid arthritis     Vertigo        Past Surgical History:  Past Surgical History:   Procedure Laterality Date    CARDIAC PACEMAKER PLACEMENT      CATARACT EXTRACTION      CATARACT EXTRACTION W/  INTRAOCULAR LENS IMPLANT Left 12-29-14    Canaloplasty    HYSTERECTOMY      TOE AMPUTATION Right     TOTAL KNEE ARTHROPLASTY           Family History:  Family History   Problem Relation Age of Onset    Diabetes Mother     Diabetes Sister     Glaucoma Sister     Diabetes Brother     Glaucoma Brother     Blindness Brother        Social History:  Social History     Social History Main Topics    Smoking status: Never Smoker    Smokeless tobacco: Never Used    Alcohol use No    Drug use: No    Sexual activity: Not given       ROS   Review of Systems   Constitutional: Positive for unexpected weight change (+6 lbs). Negative for chills, fatigue and fever.   HENT: Negative for congestion and sore throat.    Respiratory: Positive for cough and shortness of breath.    Gastrointestinal: Negative for abdominal pain, diarrhea, nausea and vomiting.   Genitourinary: Negative for difficulty urinating, dysuria and flank pain.   Musculoskeletal: Negative for back pain, neck pain and neck stiffness.   Skin: Negative for rash and wound.   Neurological: Negative for dizziness, syncope, numbness and headaches.   All other systems reviewed and are negative.      Physical Exam      Initial Vitals [05/24/17 2155]   BP Pulse Resp Temp SpO2   (!) 154/83 76 14 98.4 °F (36.9 °C) 97 %      Physical Exam  Nursing Notes and Vital Signs Reviewed.  Constitutional: Patient is in no apparent distress. Well-developed and well-nourished. Elderly.  Head: Atraumatic. Normocephalic.  Eyes: PERRL. EOM intact. Conjunctivae are not pale. No scleral icterus.  ENT: Mucous membranes are moist. Oropharynx is clear and symmetric.    Neck: Supple. Full ROM. No lymphadenopathy.  Cardiovascular: Regular rate. Regular rhythm. No murmurs, rubs, or  "gallops. Distal pulses are 2+ and symmetric.  Pulmonary/Chest: No respiratory distress. Clear to auscultation bilaterally. No wheezing, rales, or rhonchi.  Abdominal: Soft and non-distended.  There is no tenderness.  No rebound, guarding, or rigidity.   Genitourinary: No CVA tenderness  Musculoskeletal: Moves all extremities. No obvious deformities. 2+ edema to BLE. No calf tenderness.  Skin: Warm and dry.  Neurological:  Alert, awake, and appropriate.  Normal speech.  No acute focal neurological deficits are appreciated.  Psychiatric: Normal affect. Good eye contact. Appropriate in content.    ED Course    Procedures  ED Vital Signs:  Vitals:    05/24/17 2155 05/24/17 2305 05/25/17 0005 05/25/17 0153   BP: (!) 154/83 137/64 (!) 134/57    Pulse: 76 86 87    Resp: 14 16 16    Temp: 98.4 °F (36.9 °C)      TempSrc: Oral      SpO2: 97% 99% 98%    Weight: 54 kg (119 lb)   54.4 kg (120 lb)   Height: 5' 2" (1.575 m)   5' 2" (1.575 m)    05/25/17 0205 05/25/17 0405   BP: (!) 136/59 (!) 134/51   Pulse: 87 88   Resp: 16 18   Temp:     TempSrc:     SpO2: 97% 97%   Weight:     Height:         Abnormal Lab Results:  Labs Reviewed   CBC W/ AUTO DIFFERENTIAL - Abnormal; Notable for the following:        Result Value    RBC 3.07 (*)     Hemoglobin 8.0 (*)     Hematocrit 24.2 (*)     MCV 79 (*)     MCH 26.1 (*)     RDW 25.2 (*)     Mono # 1.1 (*)     Mono% 24.5 (*)     All other components within normal limits   COMPREHENSIVE METABOLIC PANEL - Abnormal; Notable for the following:     Glucose 153 (*)     BUN, Bld 62 (*)     Creatinine 2.2 (*)     Calcium 8.6 (*)     Albumin 3.0 (*)     AST 44 (*)     eGFR if  24 (*)     eGFR if non  21 (*)     All other components within normal limits   TROPONIN I - Abnormal; Notable for the following:     Troponin I 0.056 (*)     All other components within normal limits   B-TYPE NATRIURETIC PEPTIDE - Abnormal; Notable for the following:     BNP 4,618 (*)     All " other components within normal limits   PROTIME-INR - Abnormal; Notable for the following:     Prothrombin Time 49.6 (*)     INR 5.1 (*)     All other components within normal limits    Narrative:        critical result(s) called and verbal readback obtained from   INR 5.1 CINTHYA BAZAN RN, 05/24/2017 23:07   CBC W/ AUTO DIFFERENTIAL - Abnormal; Notable for the following:     RBC 2.73 (*)     Hemoglobin 7.1 (*)     Hematocrit 21.5 (*)     MCV 79 (*)     MCH 26.0 (*)     RDW 25.7 (*)     All other components within normal limits   MAGNESIUM   PHOSPHORUS   COMPREHENSIVE METABOLIC PANEL   PROTIME-INR   OCCULT BLOOD X 1, STOOL   TROPONIN I        All Lab Results:  Results for orders placed or performed during the hospital encounter of 05/24/17   CBC auto differential   Result Value Ref Range    WBC 4.37 3.90 - 12.70 K/uL    RBC 3.07 (L) 4.00 - 5.40 M/uL    Hemoglobin 8.0 (L) 12.0 - 16.0 g/dL    Hematocrit 24.2 (L) 37.0 - 48.5 %    MCV 79 (L) 82 - 98 fL    MCH 26.1 (L) 27.0 - 31.0 pg    MCHC 33.1 32.0 - 36.0 %    RDW 25.2 (H) 11.5 - 14.5 %    Platelets 156 150 - 350 K/uL    MPV SEE COMMENT 9.2 - 12.9 fL    Gran # 2.2 1.8 - 7.7 K/uL    Lymph # 1.0 1.0 - 4.8 K/uL    Mono # 1.1 (H) 0.3 - 1.0 K/uL    Eos # 0.1 0.0 - 0.5 K/uL    Baso # 0.01 0.00 - 0.20 K/uL    Gran% 49.4 38.0 - 73.0 %    Lymph% 22.7 18.0 - 48.0 %    Mono% 24.5 (H) 4.0 - 15.0 %    Eosinophil% 3.2 0.0 - 8.0 %    Basophil% 0.2 0.0 - 1.9 %    Differential Method Automated    Comprehensive metabolic panel   Result Value Ref Range    Sodium 140 136 - 145 mmol/L    Potassium 3.8 3.5 - 5.1 mmol/L    Chloride 103 95 - 110 mmol/L    CO2 25 23 - 29 mmol/L    Glucose 153 (H) 70 - 110 mg/dL    BUN, Bld 62 (H) 8 - 23 mg/dL    Creatinine 2.2 (H) 0.5 - 1.4 mg/dL    Calcium 8.6 (L) 8.7 - 10.5 mg/dL    Total Protein 6.6 6.0 - 8.4 g/dL    Albumin 3.0 (L) 3.5 - 5.2 g/dL    Total Bilirubin 0.9 0.1 - 1.0 mg/dL    Alkaline Phosphatase 87 55 - 135 U/L    AST 44 (H) 10 - 40 U/L     ALT 15 10 - 44 U/L    Anion Gap 12 8 - 16 mmol/L    eGFR if African American 24 (A) >60 mL/min/1.73 m^2    eGFR if non African American 21 (A) >60 mL/min/1.73 m^2   Troponin I   Result Value Ref Range    Troponin I 0.056 (H) 0.000 - 0.026 ng/mL   Brain natriuretic peptide   Result Value Ref Range    BNP 4,618 (H) 0 - 99 pg/mL   Protime-INR   Result Value Ref Range    Prothrombin Time 49.6 (H) 9.0 - 12.5 sec    INR 5.1 (HH) 0.8 - 1.2   CBC auto differential   Result Value Ref Range    WBC 4.42 3.90 - 12.70 K/uL    RBC 2.73 (L) 4.00 - 5.40 M/uL    Hemoglobin 7.1 (L) 12.0 - 16.0 g/dL    Hematocrit 21.5 (L) 37.0 - 48.5 %    MCV 79 (L) 82 - 98 fL    MCH 26.0 (L) 27.0 - 31.0 pg    MCHC 33.0 32.0 - 36.0 %    RDW 25.7 (H) 11.5 - 14.5 %    Platelets 168 150 - 350 K/uL    MPV SEE COMMENT 9.2 - 12.9 fL         Imaging Results:  Imaging Results          X-Ray Chest AP Portable (Final result)  Result time 05/24/17 22:52:00    Final result by Rena Tran MD (05/24/17 22:52:00)                 Impression:      Findings suggest worsening changes of congestive heart failure with increasing right-sided pleural effusion with consolidation and atelectasis in the right lower lobe.      Electronically signed by: RENA TRAN MD  Date:     05/24/17  Time:    22:52              Narrative:    Exam: XR CHEST AP PORTABLE,    Date:  05/24/17 22:39:19    History: CHF    Comparison:  03/23/2017    Findings: There is chronic moderate enlargement of the heart.  Post surgical changes present.  Pacemaker is present.  Today's study demonstrates a pattern of moderate size right-sided pleural effusion increased when compared with the previous exam.  There is consolidation and atelectasis in the right lower lobe.  A smaller left-sided pleural effusion is present.  A PICC line is positioned with its tip in superior vena cava.                             The EKG was ordered, reviewed, and independently interpreted by the ED  provider.  Interpretation time: 2202  Rate: 87 BPM  Rhythm: normal sinus rhythm  Interpretation: T wave abnormality, consider lateral ischemia. Prolonged QT. Abnormal ECG. No STEMI.         The Emergency Provider reviewed the vital signs and test results, which are outlined above.    ED Discussion     11:19 PM: Discussed case with Dr. Miller ([facility]). Dr. Miller agrees with current care and management of pt and accepts admission.   Admitting Service: [facility]   Admitting Physician: Dr. Miller  Admit to: Tele    11:28 PM: Re-evaluated pt. I have discussed test results, shared treatment plan, and the need for admission with patient and family at bedside. Pt and family express understanding at this time and agree with all information. All questions answered. Pt and family have no further questions or concerns at this time. Pt is ready for admit.      ED Medication(s):  Medications   sodium chloride 0.9% injection 3 mL (not administered)   furosemide injection 40 mg (not administered)   aluminum-magnesium hydroxide-simethicone 200-200-20 mg/5 mL suspension 30 mL (not administered)   albuterol-ipratropium 2.5mg-0.5mg/3mL nebulizer solution 3 mL (not administered)   ondansetron injection 4 mg (not administered)   hydrALAZINE injection 10 mg (not administered)   atorvastatin tablet 10 mg (not administered)   insulin detemir pen 20 Units (not administered)   metoprolol succinate (TOPROL-XL) 24 hr split tablet 12.5 mg (not administered)   latanoprost 0.005 % ophthalmic solution 1 drop (not administered)   leflunomide tablet 20 mg (not administered)   pantoprazole EC tablet 40 mg (not administered)   furosemide injection 40 mg (40 mg Intravenous Given 5/24/17 2232)       New Prescriptions    No medications on file             Medical Decision Making    Medical Decision Making:   Clinical Tests:   Lab Tests: Reviewed and Ordered  Radiological Study: Reviewed and Ordered  Medical Tests: Reviewed and  Ordered           Scribe Attestation:   Scribe #1: I performed the above scribed service and the documentation accurately describes the services I performed. I attest to the accuracy of the note.    Attending:   Physician Attestation Statement for Scribe #1: I, Jose Enrique Ambrosio MD, personally performed the services described in this documentation, as scribed by Corinne Mack, in my presence, and it is both accurate and complete.          Clinical Impression       ICD-10-CM ICD-9-CM   1. Acute on chronic congestive heart failure, unspecified congestive heart failure type I50.9 428.0   2. Chest pain, unspecified type R07.9 786.50   3. Anemia, unspecified type D64.9 285.9   4. Renal insufficiency N28.9 593.9   5. CHF, acute on chronic I50.9 428.0       Disposition:   Disposition: Admitted  Condition: Fair         Jose Enrique Ambrosio MD  05/25/17 0501

## 2017-05-25 NOTE — SUBJECTIVE & OBJECTIVE
Past Medical History:   Diagnosis Date    Arthritis     Cataract     CHF (congestive heart failure)     Diabetes mellitus     Diabetic retinopathy     Glaucoma     H/O blood clots     Heart disease     Hypertension     Kidney disease     Osteoporosis     Rheumatoid arthritis     Rheumatoid arthritis     Vertigo        Past Surgical History:   Procedure Laterality Date    CARDIAC PACEMAKER PLACEMENT      CATARACT EXTRACTION      CATARACT EXTRACTION W/  INTRAOCULAR LENS IMPLANT Left 12-29-14    Canaloplasty    HYSTERECTOMY      TOE AMPUTATION Right     TOTAL KNEE ARTHROPLASTY         Review of patient's allergies indicates:   Allergen Reactions    Fish containing products Hives    Percocet [oxycodone-acetaminophen] Hives and Rash    Shellfish containing products Hives    Darvocet a500  [propoxyphene n-acetaminophen]      Other reaction(s): Rash    Ibuprofen Hives     Other reaction(s): Hives    Iodine      Other reaction(s): rash    Lanolin      Other reaction(s): rash    Latex Hives     Other reaction(s): rash    Other      fragrance    Saxagliptin Other (See Comments)     Lowers calcium level     Penicillins Rash     SEVERE FACIAL SWELLING, EYES SWELLED SHUT.    Propoxyphene-acetaminophen Rash    Red dye Rash       No current facility-administered medications on file prior to encounter.      Current Outpatient Prescriptions on File Prior to Encounter   Medication Sig    allopurinol (ZYLOPRIM) 100 MG tablet Take 100 mg by mouth 2 (two) times daily.    ascorbic acid (VITAMIN C) 500 MG tablet Take 1 tablet by mouth Daily.    benzonatate (TESSALON) 100 MG capsule Take 100 mg by mouth once daily.    blood sugar diagnostic (BLOOD GLUCOSE TEST) Strp by Misc.(Non-Drug; Combo Route) route 3 (three) times daily. ONE TOUCH TRUE STRIPS    blood-glucose meter Misc Use to take blood sugar    bumetanide (BUMEX) 1 MG tablet Take 2 mg by mouth 2 (two) times daily.    calcium carbonate (OS-ALEX)  "600 mg (1,500 mg) Tab Take 600 mg by mouth.    DEXILANT 60 mg capsule     hydrocodone-acetaminophen (LORTAB)  mg per tablet Take by mouth Twice daily.    hydrocodone-acetaminophen 7.5-325mg (NORCO) 7.5-325 mg per tablet Take 1 tablet by mouth every 6 (six) hours as needed for Pain.    insulin detemir (LEVEMIR) 100 unit/mL injection Inject into the skin. 10-15 in the am    insulin detemir (LEVEMIR) 100 unit/mL injection Inject 24 Units into the skin.    insulin NPH-insulin regular (NOVOLIN 70/30) 100 unit/mL (70-30) injection Inject into the skin. 10-12 units over 400    insulin syringe-needle U-100 1 mL 31 x 5/16" Syrg     iron-vitamin C (FE C) 100-250 mg Tab Take 1 tablet by mouth Daily.    lancets (ACCU-CHEK SOFTCLIX LANCETS) Misc Stick finger to apply blood to test strip to check blood sugar 3 times daily.    latanoprost 0.005 % ophthalmic solution INSTILL 1 DROP INTOP BOTH EYES EVERY NIGHT AT BEDTIME    leflunomide (ARAVA) 20 MG tablet Take 1 tablet by mouth Daily. generic    metoprolol succinate (TOPROL-XL) 25 MG 24 hr tablet Take 12.5 mg by mouth 2 (two) times daily.     multivitamin with minerals tablet Take 1 tablet by mouth.    warfarin (COUMADIN) 5 MG tablet Take 2.5 tablets by mouth every evening.     ciprofloxacin HCl (CIPRO) 500 MG tablet     clindamycin (CLEOCIN) 300 MG capsule     fish oil-omega-3 fatty acids 300-1,000 mg capsule Take 1 g by mouth.    furosemide (LASIX) 40 MG tablet Take 1 tablet by mouth Daily.    hydrocodone-acetaminophen 5-325mg (NORCO) 5-325 mg per tablet     isosorbide-hydrALAZINE 20-37.5 mg (BIDIL) 20-37.5 mg Tab Take 1 tablet by mouth once daily.    KLOR-CON 10 10 mEq TbSR     rosuvastatin (CRESTOR) 10 MG tablet Take 1 tablet by mouth Daily.    tramadol (ULTRAM) 50 mg tablet     TRUERESULT BLOOD GLUCOSE SYSTM kit     ZOVIRAX 5 % Crea      Family History     Problem Relation (Age of Onset)    Blindness Brother    Diabetes Mother, Sister, Brother    " Glaucoma Sister, Brother        Social History Main Topics    Smoking status: Never Smoker    Smokeless tobacco: Never Used    Alcohol use No    Drug use: No    Sexual activity: Not on file     Review of Systems   Constitutional: Positive for fatigue. Negative for chills, diaphoresis and fever.   HENT: Negative.  Negative for congestion, nosebleeds and sinus pressure.    Eyes: Negative.  Negative for photophobia, redness and visual disturbance.   Respiratory: Positive for shortness of breath. Negative for cough, chest tightness and wheezing.    Cardiovascular: Positive for leg swelling. Negative for chest pain and palpitations.   Gastrointestinal: Negative.  Negative for abdominal pain, diarrhea, nausea and vomiting.   Endocrine: Negative.  Negative for polydipsia, polyphagia and polyuria.   Genitourinary: Negative.  Negative for dysuria, flank pain, frequency and urgency.   Musculoskeletal: Negative.  Negative for back pain, joint swelling and neck stiffness.   Skin: Negative.  Negative for color change, pallor and rash.   Allergic/Immunologic: Negative.  Negative for environmental allergies, food allergies and immunocompromised state.   Neurological: Negative.  Negative for dizziness, syncope, speech difficulty, numbness and headaches.   Hematological: Negative.  Negative for adenopathy. Does not bruise/bleed easily.   Psychiatric/Behavioral: Negative.  Negative for confusion, decreased concentration and hallucinations. The patient is not nervous/anxious.    All other systems reviewed and are negative.    Objective:     Vital Signs (Most Recent):  Temp: 98.4 °F (36.9 °C) (05/24/17 2155)  Pulse: 87 (05/25/17 0205)  Resp: 16 (05/25/17 0205)  BP: (!) 136/59 (05/25/17 0205)  SpO2: 97 % (05/25/17 0205) Vital Signs (24h Range):  Temp:  [98.4 °F (36.9 °C)] 98.4 °F (36.9 °C)  Pulse:  [76-87] 87  Resp:  [14-16] 16  SpO2:  [97 %-99 %] 97 %  BP: (134-154)/(57-83) 136/59     Weight: 54.4 kg (120 lb)  Body mass index is  21.95 kg/m².    Physical Exam   Constitutional: She is oriented to person, place, and time. No distress.   Elderly, frail   HENT:   Head: Normocephalic and atraumatic.   Eyes: Conjunctivae and EOM are normal. No scleral icterus.   Neck: Normal range of motion. Neck supple. No JVD present. No tracheal deviation present. No thyromegaly present.   Cardiovascular: Normal rate, regular rhythm and intact distal pulses.    Murmur heard.   Systolic murmur is present   Pulmonary/Chest: Effort normal. No respiratory distress. She has decreased breath sounds in the right lower field. She has no wheezes. She has rales. She exhibits no tenderness.   Abdominal: Soft. Bowel sounds are normal. She exhibits no distension. There is no tenderness.   Musculoskeletal: Normal range of motion. She exhibits edema (2+). She exhibits no tenderness.   Lymphadenopathy:     She has no cervical adenopathy.   Neurological: She is alert and oriented to person, place, and time. No cranial nerve deficit. She exhibits normal muscle tone. Coordination normal.   Skin: Skin is warm and dry. She is not diaphoretic. No erythema.   Psychiatric: She has a normal mood and affect. Her behavior is normal.   Nursing note and vitals reviewed.       Significant Labs:   A1C: No results for input(s): HGBA1C in the last 4320 hours.  ABGs: No results for input(s): PH, PCO2, HCO3, POCSATURATED, BE in the last 48 hours.  Bilirubin:   Recent Labs  Lab 05/24/17  2233   BILITOT 0.9     BMP:   Recent Labs  Lab 05/24/17 2233   *      K 3.8      CO2 25   BUN 62*   CREATININE 2.2*   CALCIUM 8.6*     CBC:   Recent Labs  Lab 05/24/17 2233   WBC 4.37   HGB 8.0*   HCT 24.2*        CMP:   Recent Labs  Lab 05/24/17 2233      K 3.8      CO2 25   *   BUN 62*   CREATININE 2.2*   CALCIUM 8.6*   PROT 6.6   ALBUMIN 3.0*   BILITOT 0.9   ALKPHOS 87   AST 44*   ALT 15   ANIONGAP 12   EGFRNONAA 21*     Cardiac Markers:   Recent Labs  Lab  05/24/17 2233   BNP 4,618*     Coagulation:   Recent Labs  Lab 05/24/17 2233   INR 5.1*     Lactic Acid: No results for input(s): LACTATE in the last 48 hours.  Lipase: No results for input(s): LIPASE in the last 48 hours.  Lipid Panel: No results for input(s): CHOL, HDL, LDLCALC, TRIG, CHOLHDL in the last 48 hours.  POCT Glucose: No results for input(s): POCTGLUCOSE in the last 48 hours.  Prealbumin: No results for input(s): PREALBUMIN in the last 48 hours.  Troponin:   Recent Labs  Lab 05/24/17 2233   TROPONINI 0.056*     TSH: No results for input(s): TSH in the last 4320 hours.  Urine Studies: No results for input(s): COLORU, APPEARANCEUA, PHUR, SPECGRAV, PROTEINUA, GLUCUA, KETONESU, BILIRUBINUA, OCCULTUA, NITRITE, UROBILINOGEN, LEUKOCYTESUR, RBCUA, WBCUA, BACTERIA, SQUAMEPITHEL, HYALINECASTS in the last 48 hours.    Invalid input(s): WRIGHTSUR  All pertinent labs within the past 24 hours have been reviewed.    Significant Imaging: I have reviewed and interpreted all pertinent imaging results/findings within the past 24 hours.     Imaging Results          X-Ray Chest AP Portable (Final result)  Result time 05/24/17 22:52:00    Final result by Rena Tran MD (05/24/17 22:52:00)                 Impression:      Findings suggest worsening changes of congestive heart failure with increasing right-sided pleural effusion with consolidation and atelectasis in the right lower lobe.      Electronically signed by: RENA TRAN MD  Date:     05/24/17  Time:    22:52              Narrative:    Exam: XR CHEST AP PORTABLE,    Date:  05/24/17 22:39:19    History: CHF    Comparison:  03/23/2017    Findings: There is chronic moderate enlargement of the heart.  Post surgical changes present.  Pacemaker is present.  Today's study demonstrates a pattern of moderate size right-sided pleural effusion increased when compared with the previous exam.  There is consolidation and atelectasis in the right lower lobe.  A smaller  left-sided pleural effusion is present.  A PICC line is positioned with its tip in superior vena cava.

## 2017-05-25 NOTE — ASSESSMENT & PLAN NOTE
- Creatinine 1.9 five years ago, currently 2.2 (age appropriate decline)  - Avoid nephrotoxins  - Electrolytes stable, no indication for hemodialysis

## 2017-05-25 NOTE — ASSESSMENT & PLAN NOTE
- Hgb 8.0 in setting of INR 5.1 (coumadin toxicity) and worsening SOB/LEIGH due to CHF exacerbation  - Check stool for blood  - Transfuse PRBC when Hgb < 7.0

## 2017-05-25 NOTE — SUBJECTIVE & OBJECTIVE
Interval History: Improving steadily    Review of Systems   Constitutional: Positive for fatigue. Negative for unexpected weight change.   HENT: Negative.  Negative for trouble swallowing.    Eyes: Negative.    Respiratory: Positive for shortness of breath. Negative for cough, wheezing and stridor.    Cardiovascular: Negative.  Negative for chest pain.   Gastrointestinal: Negative.    Endocrine: Negative.    Genitourinary: Negative.    Musculoskeletal: Negative.    Skin: Negative.    Allergic/Immunologic: Negative.    Neurological: Positive for weakness. Negative for dizziness.   Hematological: Negative.    Psychiatric/Behavioral: Negative.    All other systems reviewed and are negative.    Objective:     Vital Signs (Most Recent):  Temp: 98.7 °F (37.1 °C) (05/25/17 0702)  Pulse: 97 (05/25/17 0947)  Resp: 19 (05/25/17 0947)  BP: 133/69 (05/25/17 0947)  SpO2: 98 % (05/25/17 0947) Vital Signs (24h Range):  Temp:  [98.4 °F (36.9 °C)-98.7 °F (37.1 °C)] 98.7 °F (37.1 °C)  Pulse:  [76-99] 97  Resp:  [14-19] 19  SpO2:  [97 %-99 %] 98 %  BP: (125-154)/(45-83) 133/69     Weight: 54.4 kg (120 lb)  Body mass index is 21.95 kg/m².    Intake/Output Summary (Last 24 hours) at 05/25/17 1040  Last data filed at 05/25/17 0900   Gross per 24 hour   Intake                0 ml   Output             1225 ml   Net            -1225 ml      Physical Exam   Constitutional: She is oriented to person, place, and time. She appears well-developed and well-nourished.   HENT:   Head: Normocephalic and atraumatic.   Eyes: EOM are normal. Pupils are equal, round, and reactive to light.   Neck: Normal range of motion. Neck supple. No JVD present.   Cardiovascular: Normal rate, regular rhythm and intact distal pulses.    Murmur heard.  Pulmonary/Chest: Effort normal. No respiratory distress. She has wheezes.   Abdominal: Soft. Bowel sounds are normal. She exhibits no distension.   Musculoskeletal: Normal range of motion. She exhibits no edema or  tenderness.   Neurological: She is alert and oriented to person, place, and time. She has normal reflexes. No cranial nerve deficit.   Skin: Skin is warm and dry. Capillary refill takes less than 2 seconds. No erythema.   Psychiatric: She has a normal mood and affect. Her behavior is normal. Judgment and thought content normal.   Nursing note and vitals reviewed.      Significant Labs:   Recent Lab Results       05/25/17  0907 05/25/17  0755 05/25/17  0555 05/25/17  0450 05/24/17  2233      Acanthocytes    Present      Albumin   2.8(L)  3.0(L)     Alkaline Phosphatase   91  87     ALT   11  15     Anion Gap   9  12     Aniso    Moderate      AST   36  44(H)     Baso #    0.01 0.01     Basophil%    0.2 0.2     Total Bilirubin   0.8  Comment:  For infants and newborns, interpretation of results should be based  on gestational age, weight and in agreement with clinical  observations.  Premature Infant recommended reference ranges:  Up to 24 hours.............<8.0 mg/dL  Up to 48 hours............<12.0 mg/dL  3-5 days..................<15.0 mg/dL  6-29 days.................<15.0 mg/dL    0.9  Comment:  For infants and newborns, interpretation of results should be based  on gestational age, weight and in agreement with clinical  observations.  Premature Infant recommended reference ranges:  Up to 24 hours.............<8.0 mg/dL  Up to 48 hours............<12.0 mg/dL  3-5 days..................<15.0 mg/dL  6-29 days.................<15.0 mg/dL       BNP     4,618  Comment:  Values of less than 100 pg/ml are consistent with non-CHF populations.(H)     BUN, Bld   61(H)  62(H)     [place] Cells    Moderate      Calcium   8.4(L)  8.6(L)     Chloride   105  103     CO2   25  25     Creatinine   2.1(H)  2.2(H)     Differential Method    Automated Automated     eGFR if    26(A)  24(A)     eGFR if non    22  Comment:  Calculation used to obtain the estimated glomerular filtration  rate (eGFR) is the  CKD-EPI equation. Since race is unknown   in our information system, the eGFR values for   -American and Non--American patients are given   for each creatinine result.  (A)  21  Comment:  Calculation used to obtain the estimated glomerular filtration  rate (eGFR) is the CKD-EPI equation. Since race is unknown   in our information system, the eGFR values for   -American and Non--American patients are given   for each creatinine result.  (A)     Eos #    0.2 0.1     Eosinophil%    3.6 3.2     Glucose   235(H)  153(H)     Gran #    2.4 2.2     Gran%    54.6 49.4     Hematocrit    21.5(L) 24.2(L)     Hemoglobin    7.1(L) 8.0(L)     Coumadin Monitoring INR    5.3  Comment:  Coumadin Therapy:  2.0 - 3.0 for INR for all indicators except mechanical heart valves  and antiphospholipid syndromes which should use 2.5 - 3.5.  critical result(s) called and verbal readback obtained from   INR 5.3 CINTHYA BAZAN RN, 05/25/2017 05:46  (HH) 5.1  Comment:  Coumadin Therapy:  2.0 - 3.0 for INR for all indicators except mechanical heart valves  and antiphospholipid syndromes which should use 2.5 - 3.5.  critical result(s) called and verbal readback obtained from   INR 5.1 CINHTYA BAZAN RN, 05/24/2017 23:07  (HH)     Lymph #    0.9(L) 1.0     Lymph%    20.8 22.7     Magnesium   1.9       MCH    26.0(L) 26.1(L)     MCHC    33.0 33.1     MCV    79(L) 79(L)     Mono #    0.9 1.1(H)     Mono%    21.0(H) 24.5(H)     MPV    SEE COMMENT  Comment:  Result not available. SEE COMMENT  Comment:  Result not available.     Phosphorus   3.2       Platelets    168 156     POCT Glucose 252(H) 230(H)        Poik    Moderate      Potassium   3.3(L)  3.8     Total Protein   6.1  6.6     Protime    51.5(H) 49.6(H)     RBC    2.73(L) 3.07(L)     RDW    25.7(H) 25.2(H)     Schistocytes    Present      Sodium   139  140     Spherocytes    Occasional      Stomatocytes    Present      Target Cells    Moderate      Tear Drop Cells     Occasional      Troponin I   0.051  Comment:  The reference interval for Troponin I represents the 99th percentile   cutoff   for our facility and is consistent with 3rd generation assay   performance.  (H)  0.056  Comment:  The reference interval for Troponin I represents the 99th percentile   cutoff   for our facility and is consistent with 3rd generation assay   performance.  (H)     WBC    4.42 4.37                     Significant Imaging: I have reviewed and interpreted all pertinent imaging results/findings within the past 24 hours.

## 2017-05-25 NOTE — ASSESSMENT & PLAN NOTE
- No prior echo on record  - Check Echo in AM  - Takes bumex 2 mg po bid at home  - Start Lasix 40 mg IB BID  - Monitor renal function and UOP    5/25/17: overall she is feeling better and had about 1200 output. Echo is pending as she doesn't have a previous one her as her Cardiologist is Dr. Nieves.    5/26/17 Patient continues to improve. Plan for Dobutamine Gtt cessation

## 2017-05-25 NOTE — ED NOTES
Patient placed on continuous cardiac monitor, automatic blood pressure cuff and continuous pulse oximeter. Dobutamine infusion currently infusing through pt picc line. Will cont to closely monitor

## 2017-05-25 NOTE — ED NOTES
Pt sitting up in bed in NAD, VSS, RR equal and unlabored. Bed is low, locked, and call light in reach. Side rails up x 2. Pt placed on bed pan per request by ERT, no further needs at this time.

## 2017-05-25 NOTE — ED NOTES
Pt lying in bed in NAD, VSS, RR equal and unlabored. Bed is low, locked, and call light in reach. Side rails up x 2. Pt family at bs, discussed POC with pt and family, pt and family verbalize understanding.

## 2017-05-25 NOTE — ED NOTES
Pt lying in bed resting in NAD, VSS, RR equal and unlabored. Bed is low, locked, and call light in reach. Side rails up x 2. Dobutamine infusing through PICC line. Pt has no complaints or needs at this time.

## 2017-05-25 NOTE — ED NOTES
Pt. Resting in bed. No acute distress, RR equal and non labored, VSS. Remains connected to cardiac monitor. Still awaiting bed placement. Bed in low, locked, and call light in reach. Side rails up X 2. Will continue to monitor.

## 2017-05-25 NOTE — H&P
[facility]  History & Physical    Patient Name: Vita Field  MRN: [id number]  Admission Date: 5/24/2017  Attending Physician: Carlos Miller MD  Primary Care Provider: Tabatha Jacques MD         Patient information was obtained from patient and ER records.     Subjective:     Principal Problem:Acute on chronic congestive heart failure    Chief Complaint:   Chief Complaint   Patient presents with    Shortness of Breath     sob x 1 hr. HX of CHF, Resolved with 2 L of O2 via NC        HPI: Ms. 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 She denies chest pain, but c/o bilateral lower extremity in spite of taking bumex 2 mg po bid. In the ED, labs shows BNP 4900, troponin 0.056, INR 5.1, Hgb 8.0. Patient is poor historian. Denies rectal bleed or hematemesis. Baseline creatinine 1.9 in 2012, currently 2.2. Admitted for CHF exacerbation, coumadin toxicity and symptomatic anemia.    Past Medical History:   Diagnosis Date    Arthritis     Cataract     CHF (congestive heart failure)     Diabetes mellitus     Diabetic retinopathy     Glaucoma     H/O blood clots     Heart disease     Hypertension     Kidney disease     Osteoporosis     Rheumatoid arthritis     Rheumatoid arthritis     Vertigo        Past Surgical History:   Procedure Laterality Date    CARDIAC PACEMAKER PLACEMENT      CATARACT EXTRACTION      CATARACT EXTRACTION W/  INTRAOCULAR LENS IMPLANT Left 12-29-14    Canaloplasty    HYSTERECTOMY      TOE AMPUTATION Right     TOTAL KNEE ARTHROPLASTY         Review of patient's allergies indicates:   Allergen Reactions    Fish containing products Hives    Percocet [oxycodone-acetaminophen] Hives and Rash    Shellfish containing products Hives    Darvocet a500  [propoxyphene n-acetaminophen]      Other reaction(s): Rash  "   Ibuprofen Hives     Other reaction(s): Hives    Iodine      Other reaction(s): rash    Lanolin      Other reaction(s): rash    Latex Hives     Other reaction(s): rash    Other      fragrance    Saxagliptin Other (See Comments)     Lowers calcium level     Penicillins Rash     SEVERE FACIAL SWELLING, EYES SWELLED SHUT.    Propoxyphene-acetaminophen Rash    Red dye Rash       No current facility-administered medications on file prior to encounter.      Current Outpatient Prescriptions on File Prior to Encounter   Medication Sig    allopurinol (ZYLOPRIM) 100 MG tablet Take 100 mg by mouth 2 (two) times daily.    ascorbic acid (VITAMIN C) 500 MG tablet Take 1 tablet by mouth Daily.    benzonatate (TESSALON) 100 MG capsule Take 100 mg by mouth once daily.    blood sugar diagnostic (BLOOD GLUCOSE TEST) Strp by Misc.(Non-Drug; Combo Route) route 3 (three) times daily. ONE TOUCH TRUE STRIPS    blood-glucose meter Misc Use to take blood sugar    bumetanide (BUMEX) 1 MG tablet Take 2 mg by mouth 2 (two) times daily.    calcium carbonate (OS-ALEX) 600 mg (1,500 mg) Tab Take 600 mg by mouth.    DEXILANT 60 mg capsule     hydrocodone-acetaminophen (LORTAB)  mg per tablet Take by mouth Twice daily.    hydrocodone-acetaminophen 7.5-325mg (NORCO) 7.5-325 mg per tablet Take 1 tablet by mouth every 6 (six) hours as needed for Pain.    insulin detemir (LEVEMIR) 100 unit/mL injection Inject into the skin. 10-15 in the am    insulin detemir (LEVEMIR) 100 unit/mL injection Inject 24 Units into the skin.    insulin NPH-insulin regular (NOVOLIN 70/30) 100 unit/mL (70-30) injection Inject into the skin. 10-12 units over 400    insulin syringe-needle U-100 1 mL 31 x 5/16" Syrg     iron-vitamin C (FE C) 100-250 mg Tab Take 1 tablet by mouth Daily.    lancets (ACCU-CHEK SOFTCLIX LANCETS) Misc Stick finger to apply blood to test strip to check blood sugar 3 times daily.    latanoprost 0.005 % ophthalmic " solution INSTILL 1 DROP INTOP BOTH EYES EVERY NIGHT AT BEDTIME    leflunomide (ARAVA) 20 MG tablet Take 1 tablet by mouth Daily. generic    metoprolol succinate (TOPROL-XL) 25 MG 24 hr tablet Take 12.5 mg by mouth 2 (two) times daily.     multivitamin with minerals tablet Take 1 tablet by mouth.    warfarin (COUMADIN) 5 MG tablet Take 2.5 tablets by mouth every evening.     ciprofloxacin HCl (CIPRO) 500 MG tablet     clindamycin (CLEOCIN) 300 MG capsule     fish oil-omega-3 fatty acids 300-1,000 mg capsule Take 1 g by mouth.    furosemide (LASIX) 40 MG tablet Take 1 tablet by mouth Daily.    hydrocodone-acetaminophen 5-325mg (NORCO) 5-325 mg per tablet     isosorbide-hydrALAZINE 20-37.5 mg (BIDIL) 20-37.5 mg Tab Take 1 tablet by mouth once daily.    KLOR-CON 10 10 mEq TbSR     rosuvastatin (CRESTOR) 10 MG tablet Take 1 tablet by mouth Daily.    tramadol (ULTRAM) 50 mg tablet     TRUERESULT BLOOD GLUCOSE SYSTM kit     ZOVIRAX 5 % Crea      Family History     Problem Relation (Age of Onset)    Blindness Brother    Diabetes Mother, Sister, Brother    Glaucoma Sister, Brother        Social History Main Topics    Smoking status: Never Smoker    Smokeless tobacco: Never Used    Alcohol use No    Drug use: No    Sexual activity: Not on file     Review of Systems   Constitutional: Positive for fatigue. Negative for chills, diaphoresis and fever.   HENT: Negative.  Negative for congestion, nosebleeds and sinus pressure.    Eyes: Negative.  Negative for photophobia, redness and visual disturbance.   Respiratory: Positive for shortness of breath. Negative for cough, chest tightness and wheezing.    Cardiovascular: Positive for leg swelling. Negative for chest pain and palpitations.   Gastrointestinal: Negative.  Negative for abdominal pain, diarrhea, nausea and vomiting.   Endocrine: Negative.  Negative for polydipsia, polyphagia and polyuria.   Genitourinary: Negative.  Negative for dysuria, flank pain,  frequency and urgency.   Musculoskeletal: Negative.  Negative for back pain, joint swelling and neck stiffness.   Skin: Negative.  Negative for color change, pallor and rash.   Allergic/Immunologic: Negative.  Negative for environmental allergies, food allergies and immunocompromised state.   Neurological: Negative.  Negative for dizziness, syncope, speech difficulty, numbness and headaches.   Hematological: Negative.  Negative for adenopathy. Does not bruise/bleed easily.   Psychiatric/Behavioral: Negative.  Negative for confusion, decreased concentration and hallucinations. The patient is not nervous/anxious.    All other systems reviewed and are negative.    Objective:     Vital Signs (Most Recent):  Temp: 98.4 °F (36.9 °C) (05/24/17 2155)  Pulse: 87 (05/25/17 0205)  Resp: 16 (05/25/17 0205)  BP: (!) 136/59 (05/25/17 0205)  SpO2: 97 % (05/25/17 0205) Vital Signs (24h Range):  Temp:  [98.4 °F (36.9 °C)] 98.4 °F (36.9 °C)  Pulse:  [76-87] 87  Resp:  [14-16] 16  SpO2:  [97 %-99 %] 97 %  BP: (134-154)/(57-83) 136/59     Weight: 54.4 kg (120 lb)  Body mass index is 21.95 kg/m².    Physical Exam   Constitutional: She is oriented to person, place, and time. No distress.   Elderly, frail   HENT:   Head: Normocephalic and atraumatic.   Eyes: Conjunctivae and EOM are normal. No scleral icterus.   Neck: Normal range of motion. Neck supple. No JVD present. No tracheal deviation present. No thyromegaly present.   Cardiovascular: Normal rate, regular rhythm and intact distal pulses.    Murmur heard.   Systolic murmur is present   Pulmonary/Chest: Effort normal. No respiratory distress. She has decreased breath sounds in the right lower field. She has no wheezes. She has rales. She exhibits no tenderness.   Abdominal: Soft. Bowel sounds are normal. She exhibits no distension. There is no tenderness.   Musculoskeletal: Normal range of motion. She exhibits edema (2+). She exhibits no tenderness.   Lymphadenopathy:     She has no  cervical adenopathy.   Neurological: She is alert and oriented to person, place, and time. No cranial nerve deficit. She exhibits normal muscle tone. Coordination normal.   Skin: Skin is warm and dry. She is not diaphoretic. No erythema.   Psychiatric: She has a normal mood and affect. Her behavior is normal.   Nursing note and vitals reviewed.       Significant Labs:   A1C: No results for input(s): HGBA1C in the last 4320 hours.  ABGs: No results for input(s): PH, PCO2, HCO3, POCSATURATED, BE in the last 48 hours.  Bilirubin:   Recent Labs  Lab 05/24/17 2233   BILITOT 0.9     BMP:   Recent Labs  Lab 05/24/17 2233   *      K 3.8      CO2 25   BUN 62*   CREATININE 2.2*   CALCIUM 8.6*     CBC:   Recent Labs  Lab 05/24/17 2233   WBC 4.37   HGB 8.0*   HCT 24.2*        CMP:   Recent Labs  Lab 05/24/17 2233      K 3.8      CO2 25   *   BUN 62*   CREATININE 2.2*   CALCIUM 8.6*   PROT 6.6   ALBUMIN 3.0*   BILITOT 0.9   ALKPHOS 87   AST 44*   ALT 15   ANIONGAP 12   EGFRNONAA 21*     Cardiac Markers:   Recent Labs  Lab 05/24/17 2233   BNP 4,618*     Coagulation:   Recent Labs  Lab 05/24/17 2233   INR 5.1*     Lactic Acid: No results for input(s): LACTATE in the last 48 hours.  Lipase: No results for input(s): LIPASE in the last 48 hours.  Lipid Panel: No results for input(s): CHOL, HDL, LDLCALC, TRIG, CHOLHDL in the last 48 hours.  POCT Glucose: No results for input(s): POCTGLUCOSE in the last 48 hours.  Prealbumin: No results for input(s): PREALBUMIN in the last 48 hours.  Troponin:   Recent Labs  Lab 05/24/17 2233   TROPONINI 0.056*     TSH: No results for input(s): TSH in the last 4320 hours.  Urine Studies: No results for input(s): COLORU, APPEARANCEUA, PHUR, SPECGRAV, PROTEINUA, GLUCUA, KETONESU, BILIRUBINUA, OCCULTUA, NITRITE, UROBILINOGEN, LEUKOCYTESUR, RBCUA, WBCUA, BACTERIA, SQUAMEPITHEL, HYALINECASTS in the last 48 hours.    Invalid input(s): BRANDIN  All  pertinent labs within the past 24 hours have been reviewed.    Significant Imaging: I have reviewed and interpreted all pertinent imaging results/findings within the past 24 hours.     Imaging Results          X-Ray Chest AP Portable (Final result)  Result time 05/24/17 22:52:00    Final result by Rena Tran MD (05/24/17 22:52:00)                 Impression:      Findings suggest worsening changes of congestive heart failure with increasing right-sided pleural effusion with consolidation and atelectasis in the right lower lobe.      Electronically signed by: RENA TRAN MD  Date:     05/24/17  Time:    22:52              Narrative:    Exam: XR CHEST AP PORTABLE,    Date:  05/24/17 22:39:19    History: CHF    Comparison:  03/23/2017    Findings: There is chronic moderate enlargement of the heart.  Post surgical changes present.  Pacemaker is present.  Today's study demonstrates a pattern of moderate size right-sided pleural effusion increased when compared with the previous exam.  There is consolidation and atelectasis in the right lower lobe.  A smaller left-sided pleural effusion is present.  A PICC line is positioned with its tip in superior vena cava.                                Assessment/Plan:     * Acute on chronic congestive heart failure    - No prior echo on record  - Check Echo in AM  - Takes bumex 2 mg po bid at home  - Start Lasix 40 mg IB BID  - Monitor renal function and UOP          Symptomatic anemia    - Hgb 8.0 in setting of INR 5.1 (coumadin toxicity) and worsening SOB/LEIGH due to CHF exacerbation  - Check stool for blood  - Transfuse PRBC when Hgb < 7.0          Coumadin toxicity    - On coumadin for h/o VTE  - Hold warfarin, monitor H/H  - No evidence of bleeding at this time          CKD (chronic kidney disease), stage V    - Creatinine 1.9 five years ago, currently 2.2 (age appropriate decline)  - Avoid nephrotoxins  - Electrolytes stable, no indication for hemodialysis           Type 2 diabetes mellitus with diabetic chronic kidney disease    - ISS  - Check HbA1C            VTE Risk Mitigation         Ordered     Place sequential compression device  Until discontinued      05/25/17 0305     Medium Risk of VTE  Once      05/25/17 0058        Carlos Miller MD  Department of Hospital Medicine   [facility]

## 2017-05-25 NOTE — ED NOTES
Pt family at bs, updated family on POC and pt status. Pt has home dobutamine infusing through PICC line in Rt arm at a rate of 19.2mg/hr (320mcg/min)

## 2017-05-25 NOTE — ASSESSMENT & PLAN NOTE
- No prior echo on record  - Check Echo in AM  - Takes bumex 2 mg po bid at home  - Start Lasix 40 mg IB BID  - Monitor renal function and UOP

## 2017-05-25 NOTE — ED NOTES
Pt resting in bed with eyes closed in NAD,VSS,RR equal and unlabored. Bed is low, locked, and call light in reach. Side rails up x 2.

## 2017-05-25 NOTE — ED NOTES
Received report from REZA Dominguez. Pt in NAD,VSS, RR equal and unlabored. Pt awaiting results. Pt's bed is low, locked, and call light in reach. SR up x 2. Will continue to monitor pt.

## 2017-05-25 NOTE — HPI
Ms. 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 She denies chest pain, but c/o bilateral lower extremity in spite of taking bumex 2 mg po bid. In the ED, labs shows BNP 4900, troponin 0.056, INR 5.1, Hgb 8.0. Patient is poor historian. Denies rectal bleed or hematemesis. Baseline creatinine 1.9 in 2012, currently 2.2. Admitted for CHF exacerbation, coumadin toxicity and symptomatic anemia.

## 2017-05-26 LAB
ANION GAP SERPL CALC-SCNC: 11 MMOL/L
BUN SERPL-MCNC: 50 MG/DL
CALCIUM SERPL-MCNC: 9 MG/DL
CHLORIDE SERPL-SCNC: 103 MMOL/L
CO2 SERPL-SCNC: 29 MMOL/L
CREAT SERPL-MCNC: 1.5 MG/DL
EST. GFR  (AFRICAN AMERICAN): 38 ML/MIN/1.73 M^2
EST. GFR  (NON AFRICAN AMERICAN): 33 ML/MIN/1.73 M^2
GLUCOSE SERPL-MCNC: 59 MG/DL
POCT GLUCOSE: 151 MG/DL (ref 70–110)
POCT GLUCOSE: 151 MG/DL (ref 70–110)
POCT GLUCOSE: 223 MG/DL (ref 70–110)
POCT GLUCOSE: 250 MG/DL (ref 70–110)
POCT GLUCOSE: 29 MG/DL (ref 70–110)
POCT GLUCOSE: 51 MG/DL (ref 70–110)
POCT GLUCOSE: 66 MG/DL (ref 70–110)
POTASSIUM SERPL-SCNC: 3.3 MMOL/L
SODIUM SERPL-SCNC: 143 MMOL/L

## 2017-05-26 PROCEDURE — 25000003 PHARM REV CODE 250: Performed by: INTERNAL MEDICINE

## 2017-05-26 PROCEDURE — 63600175 PHARM REV CODE 636 W HCPCS: Performed by: EMERGENCY MEDICINE

## 2017-05-26 PROCEDURE — 25000003 PHARM REV CODE 250: Performed by: EMERGENCY MEDICINE

## 2017-05-26 PROCEDURE — 80048 BASIC METABOLIC PNL TOTAL CA: CPT

## 2017-05-26 PROCEDURE — 63600175 PHARM REV CODE 636 W HCPCS: Performed by: NURSE PRACTITIONER

## 2017-05-26 PROCEDURE — 21400001 HC TELEMETRY ROOM

## 2017-05-26 PROCEDURE — 99223 1ST HOSP IP/OBS HIGH 75: CPT | Mod: ,,, | Performed by: INTERNAL MEDICINE

## 2017-05-26 RX ORDER — IBUPROFEN 200 MG
16 TABLET ORAL
Status: DISCONTINUED | OUTPATIENT
Start: 2017-05-26 | End: 2017-05-27 | Stop reason: HOSPADM

## 2017-05-26 RX ORDER — GLUCAGON 1 MG
1 KIT INJECTION
Status: DISCONTINUED | OUTPATIENT
Start: 2017-05-26 | End: 2017-05-27 | Stop reason: HOSPADM

## 2017-05-26 RX ORDER — IBUPROFEN 200 MG
24 TABLET ORAL
Status: DISCONTINUED | OUTPATIENT
Start: 2017-05-26 | End: 2017-05-27 | Stop reason: HOSPADM

## 2017-05-26 RX ADMIN — SODIUM CHLORIDE, PRESERVATIVE FREE 3 ML: 5 INJECTION INTRAVENOUS at 04:05

## 2017-05-26 RX ADMIN — METOPROLOL SUCCINATE 12.5 MG: 25 TABLET, EXTENDED RELEASE ORAL at 09:05

## 2017-05-26 RX ADMIN — SODIUM CHLORIDE, PRESERVATIVE FREE 3 ML: 5 INJECTION INTRAVENOUS at 06:05

## 2017-05-26 RX ADMIN — LEFLUNOMIDE 20 MG: 20 TABLET, FILM COATED ORAL at 09:05

## 2017-05-26 RX ADMIN — FUROSEMIDE 40 MG: 10 INJECTION, SOLUTION INTRAMUSCULAR; INTRAVENOUS at 05:05

## 2017-05-26 RX ADMIN — Medication 16 G: at 05:05

## 2017-05-26 RX ADMIN — ATORVASTATIN CALCIUM 10 MG: 10 TABLET, FILM COATED ORAL at 09:05

## 2017-05-26 RX ADMIN — DOBUTAMINE IN DEXTROSE 320 MCG/MIN: 200 INJECTION, SOLUTION INTRAVENOUS at 05:05

## 2017-05-26 RX ADMIN — SODIUM CHLORIDE, PRESERVATIVE FREE 3 ML: 5 INJECTION INTRAVENOUS at 10:05

## 2017-05-26 RX ADMIN — FUROSEMIDE 40 MG: 10 INJECTION, SOLUTION INTRAMUSCULAR; INTRAVENOUS at 09:05

## 2017-05-26 RX ADMIN — PANTOPRAZOLE SODIUM 40 MG: 40 TABLET, DELAYED RELEASE ORAL at 09:05

## 2017-05-26 RX ADMIN — LATANOPROST 1 DROP: 50 SOLUTION OPHTHALMIC at 09:05

## 2017-05-26 NOTE — NURSING
Pt. BG below 50; apple juice given and brought BG up to 66; 16G glucose tablets given and a sandwich to eat. BG now 150. Will continue to monitor.

## 2017-05-26 NOTE — NURSING
Pt. Had a 6-beat run of V-tach; pt. Denies SOB or pain and is asymptomatic. MD. Johns notified; Will continue to monitor.

## 2017-05-26 NOTE — ASSESSMENT & PLAN NOTE
- Hgb 7.1 in setting of INR 5.3 (coumadin toxicity) and worsening SOB/LEIGH due to CHF exacerbation  - Check stool for blood  - Transfuse PRBC when Hgb < 7.0

## 2017-05-26 NOTE — PHYSICIAN QUERY
"PT Name: Vita Field  MR #: [id number]    Physician Query Form - Heart  Condition Clarification     CDS/: Jossie Botello               Contact information:[phone]  This form is a permanent document in the medical record.     Query Date: May 26, 2017    By submitting this query, we are merely seeking further clarification of documentation. Please utilize your independent clinical judgment when addressing the question(s) below.    The medical record contains the following   Indicators     Supporting Clinical Findings Location in Medical Record   x BNP BNP 4900 H&P    EF      Radiology findings     x Echo Results 1 - Severe left atrial enlargement.   2 - Concentric hypertrophy.   3 - Severely depressed left ventricular systolic function (EF 20-25%).   4 - Mildly depressed right ventricular systolic function .   5 - Pulmonary hypertension. The estimated PA systolic pressure is 81 mmHg.   6 - Mitral valve prosthesis.   7 - Mild to moderate tricuspid regurgitation.   8 - Increased central venous pressure.  Echo 5-25    "Ascites" documented     x "SOB" or "LEIGH" documented SOB,LEIGH H&P    "Hypoxia" documented     x Heart Failure documented Admitted for CHF exacerbation H&P    "Edema" documented     x Diuretics/Meds Dobutamine infusion   IV lasix Mar 5-25    Treatment:      Other:          Provider, please specify diagnosis or diagnoses associated with above clinical findings.                               [  ] Acute on Chronic Systolic Heart Failure ( EF < 40)*  [  ] Acute on Chronic Diastolic Heart Failure( EF > 40)*  [x  ] Acute on Chronic Combined Systolic and Diastolic Heart Failure  [  ] Other (please specify): ___________________________________              *[employer] Association                                                                                                          Please document in your progress notes daily for the duration of treatment until resolved and include in your " discharge summary.

## 2017-05-26 NOTE — ED NOTES
Received report from REZA More Pt. In NAD, VSS, RR equal and unlabored. Pt awaiting bed assignment. Pt's bed is low, locked, and call light in reach. SR up X2. Will continue to monitor pt.

## 2017-05-26 NOTE — ASSESSMENT & PLAN NOTE
- On coumadin for h/o VTE  - Hold warfarin, monitor H/H - INR 5.3 today  - No evidence of bleeding at this time

## 2017-05-26 NOTE — PLAN OF CARE
Problem: Patient Care Overview  Goal: Plan of Care Review  Outcome: Ongoing (interventions implemented as appropriate)  Pt. Currently resting in bed; NSR on monitor on RA.   Dobutamine gtt infusing at 9.6 ml/hr. SCDs initiated.  BG monitoring initiated.   Safety/fall precautions maintained. Will continue to monitor.

## 2017-05-26 NOTE — ASSESSMENT & PLAN NOTE
- Creatinine 1.9 five years ago, currently 1.5   - Avoid nephrotoxins  - Electrolytes stable, no indication for hemodialysis

## 2017-05-26 NOTE — CONSULTS
Consult Note  Cardiology    Consult Requested By: Dr Johns  Reason for Consult: V Tach     SUBJECTIVE:     History of Present Illness:  Ms Field is a 77 y.o. female presents with PMHx of Systolic Heart Failure, DM, CKD and HTN. She presents to [facility] Emergency Room with shortness of breath. Associated symptoms include orthopnea, chest pain and LEIGH. Symptoms started about 2 weeks, after mother's days, and having getting worse. Primary Cardiologist is Dr Nieves. BNP 4618. Troponin 0.051>0.061.  EKG: Normal Sinus Rhythm with T wave abnormally. She had 6 beat run of V Tach on the cardiac morning and was asymptomatic. No reports of AICD firing. Dobutamine drip infusing. She is feeling much better since admit, denies symptoms of chest pain or shortness of breath.     Review of patient's allergies indicates:   Allergen Reactions    Fish containing products Hives    Percocet [oxycodone-acetaminophen] Hives and Rash    Shellfish containing products Hives    Darvocet a500  [propoxyphene n-acetaminophen]      Other reaction(s): Rash    Ibuprofen Hives     Other reaction(s): Hives    Iodine      Other reaction(s): rash    Lanolin      Other reaction(s): rash    Latex Hives     Other reaction(s): rash    Other      fragrance    Saxagliptin Other (See Comments)     Lowers calcium level     Penicillins Rash     SEVERE FACIAL SWELLING, EYES SWELLED SHUT.    Propoxyphene-acetaminophen Rash    Red dye Rash       Past Medical History:   Diagnosis Date    Arthritis     Cataract     CHF (congestive heart failure)     Diabetes mellitus     Diabetic retinopathy     Glaucoma     H/O blood clots     Heart disease     Hypertension     Kidney disease     Osteoporosis     Rheumatoid arthritis     Rheumatoid arthritis     Vertigo      Past Surgical History:   Procedure Laterality Date    CARDIAC PACEMAKER PLACEMENT      CATARACT EXTRACTION      CATARACT EXTRACTION W/  INTRAOCULAR LENS IMPLANT Left 12-29-14     Canaloplasty    HYSTERECTOMY      TOE AMPUTATION Right     TOTAL KNEE ARTHROPLASTY       Family History   Problem Relation Age of Onset    Diabetes Mother     Diabetes Sister     Glaucoma Sister     Diabetes Brother     Glaucoma Brother     Blindness Brother      Social History   Substance Use Topics    Smoking status: Never Smoker    Smokeless tobacco: Never Used    Alcohol use No        Review of Systems:  Constitutional: negative for fever or chills   Eyes: negative  Ears, nose, mouth, throat, and face: negative  Respiratory: positive for shortness of breath and orthopnea, no PND  Cardiovascular: positive for chest pain, negative, palpitations, syncope or near syncope  Gastrointestinal: negative for nausea or vomiting  Genitourinary:negative  Hematologic/lymphatic: negative  Musculoskeletal:negative,   Neurological: negative  Behavioral/Psych: negative  Endocrine: negative  Allergic/Immunologic: negative    OBJECTIVE:     Vital Signs (Most Recent)  Temp: 97.5 °F (36.4 °C) (05/26/17 0736)  Pulse: 89 (05/26/17 0736)  Resp: 18 (05/26/17 0736)  BP: 138/71 (05/26/17 0736)  SpO2: 96 % (05/26/17 0917)    Vital Signs Range (Last 24H):  Temp:  [96.5 °F (35.8 °C)-97.5 °F (36.4 °C)]   Pulse:  [72-97]   Resp:  [15-18]   BP: (129-153)/(54-92)   SpO2:  [95 %-100 %]     Current Facility-Administered Medications   Medication    albuterol-ipratropium 2.5mg-0.5mg/3mL nebulizer solution 3 mL    aluminum-magnesium hydroxide-simethicone 200-200-20 mg/5 mL suspension 30 mL    atorvastatin tablet 10 mg    dextrose 50% injection 12.5 g    dextrose 50% injection 25 g    DOBUTamine 500mg in D5W 250mL infusion (premix) (NON-TITRATING)    furosemide injection 40 mg    glucagon (human recombinant) injection 1 mg    glucose chewable tablet 16 g    glucose chewable tablet 24 g    hydrALAZINE injection 10 mg    insulin detemir pen 20 Units    latanoprost 0.005 % ophthalmic solution 1 drop    leflunomide tablet 20 mg  "   metoprolol succinate (TOPROL-XL) 24 hr split tablet 12.5 mg    ondansetron injection 4 mg    pantoprazole EC tablet 40 mg    sodium chloride 0.9% injection 3 mL     No current facility-administered medications on file prior to encounter.      Current Outpatient Prescriptions on File Prior to Encounter   Medication Sig    allopurinol (ZYLOPRIM) 100 MG tablet Take 100 mg by mouth 2 (two) times daily.    ascorbic acid (VITAMIN C) 500 MG tablet Take 1 tablet by mouth Daily.    benzonatate (TESSALON) 100 MG capsule Take 100 mg by mouth once daily.    blood sugar diagnostic (BLOOD GLUCOSE TEST) Strp by Misc.(Non-Drug; Combo Route) route 3 (three) times daily. ONE TOUCH TRUE STRIPS    blood-glucose meter Misc Use to take blood sugar    bumetanide (BUMEX) 1 MG tablet Take 2 mg by mouth 2 (two) times daily.    calcium carbonate (OS-ALEX) 600 mg (1,500 mg) Tab Take 600 mg by mouth.    DEXILANT 60 mg capsule     hydrocodone-acetaminophen (LORTAB)  mg per tablet Take by mouth Twice daily.    hydrocodone-acetaminophen 7.5-325mg (NORCO) 7.5-325 mg per tablet Take 1 tablet by mouth every 6 (six) hours as needed for Pain.    insulin detemir (LEVEMIR) 100 unit/mL injection Inject into the skin. 10-15 in the am    insulin detemir (LEVEMIR) 100 unit/mL injection Inject 24 Units into the skin.    insulin NPH-insulin regular (NOVOLIN 70/30) 100 unit/mL (70-30) injection Inject into the skin. 10-12 units over 400    insulin syringe-needle U-100 1 mL 31 x 5/16" Syrg     iron-vitamin C (FE C) 100-250 mg Tab Take 1 tablet by mouth Daily.    lancets (ACCU-CHEK SOFTCLIX LANCETS) Misc Stick finger to apply blood to test strip to check blood sugar 3 times daily.    latanoprost 0.005 % ophthalmic solution INSTILL 1 DROP INTOP BOTH EYES EVERY NIGHT AT BEDTIME    leflunomide (ARAVA) 20 MG tablet Take 1 tablet by mouth Daily. generic    metoprolol succinate (TOPROL-XL) 25 MG 24 hr tablet Take 12.5 mg by mouth 2 (two) " times daily.     multivitamin with minerals tablet Take 1 tablet by mouth.    warfarin (COUMADIN) 5 MG tablet Take 2.5 tablets by mouth every evening.     ciprofloxacin HCl (CIPRO) 500 MG tablet     clindamycin (CLEOCIN) 300 MG capsule     fish oil-omega-3 fatty acids 300-1,000 mg capsule Take 1 g by mouth.    furosemide (LASIX) 40 MG tablet Take 1 tablet by mouth Daily.    hydrocodone-acetaminophen 5-325mg (NORCO) 5-325 mg per tablet     isosorbide-hydrALAZINE 20-37.5 mg (BIDIL) 20-37.5 mg Tab Take 1 tablet by mouth once daily.    KLOR-CON 10 10 mEq TbSR     rosuvastatin (CRESTOR) 10 MG tablet Take 1 tablet by mouth Daily.    tramadol (ULTRAM) 50 mg tablet     TRUERESULT BLOOD GLUCOSE SYSTM kit     ZOVIRAX 5 % Crea        Physical Exam:  General appearance: alert, appears stated age and cooperative  Head: Normocephalic, without obvious abnormality, atraumatic  Eyes:  conjunctivae/corneas clear. PERRL, EOM's intact. Fundi benign.  Nose: no discharge  Throat: normal findings: tongue midline and normal  Neck: no adenopathy, no carotid bruit, no JVD, supple, symmetrical, trachea midline and thyroid not enlarged, symmetric, no tenderness/mass/nodules  Back:  no skin lesions, erythema, or scars  Lungs:  Rales to mid and lower lobes posterior.   Chest wall: no tenderness  Heart: regular rate and rhythm, S1, S2 normal, no murmur, click, rub or gallop  Abdomen: soft, non-tender; bowel sounds normal; no masses,  no organomegaly  Extremities: trace edema  Pulses: 1+ and symmetric  Skin: Skin color, texture, turgor normal. No rashes or lesions  Neurologic: Grossly normal    Laboratory:  Chemistry:   Lab Results   Component Value Date     05/26/2017    K 3.3 (L) 05/26/2017     05/26/2017    CO2 29 05/26/2017    BUN 50 (H) 05/26/2017    CREATININE 1.5 (H) 05/26/2017    CALCIUM 9.0 05/26/2017     Cardiac Markers:   Lab Results   Component Value Date    TROPONINI 0.061 (H) 05/25/2017     Cardiac Markers  (Last 3):   Lab Results   Component Value Date    TROPONINI 0.061 (H) 05/25/2017    TROPONINI 0.051 (H) 05/25/2017    TROPONINI 0.051 (H) 05/25/2017     CBC:   Lab Results   Component Value Date    WBC 4.42 05/25/2017    HGB 7.1 (L) 05/25/2017    HCT 21.5 (L) 05/25/2017    MCV 79 (L) 05/25/2017     05/25/2017     Lipids: No results found for: CHOL, TRIG, HDL, LDLDIRECT  Coagulation:   Lab Results   Component Value Date    INR 5.3 (HH) 05/25/2017       Diagnostic Results:  ECG: Reviewed  X-Ray: Reviewed  US: Reviewed  CT: Reviewed  Echo: Reviewed      ASSESSMENT/PLAN:     Patient Active Problem List   Diagnosis    Diabetes mellitus    Hypertension    Primary open-angle glaucoma    Pseudophakia    Epiretinal membrane    Severe stage glaucoma    Type 2 diabetes mellitus with diabetic chronic kidney disease    Acute on chronic congestive heart failure    Coumadin toxicity    Symptomatic anemia    CKD (chronic kidney disease), stage V      Patient experiencing Acute on Chronic Systolic Heart Failure. Her condition has improved since admit and pt feeling much better. She experienced 6 beat run of V Tach. No firing of AICD.     Plan:   Low sodium diet of 1.5 grams daily and limit fluid intake to 1.5 liters daily  Continue B Blocker and Lasix IV  Replace Potassium   Aggressive diuresis   Continue Dobutamine drip for now     Chart reviewed. Dr. García  agrees with plan as outlined above.

## 2017-05-26 NOTE — PROGRESS NOTES
[facility] Hospital Medicine  Progress Note    Patient Name: Vita Field  MRN: [id number]  Patient Class: IP- Inpatient   Admission Date: 5/24/2017  Length of Stay: 2 days  Attending Physician: John Johns MD  Primary Care Provider: Tabatha Jacques MD        Subjective:     Principal Problem:Acute on chronic congestive heart failure    HPI:  Ms. 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 She denies chest pain, but c/o bilateral lower extremity in spite of taking bumex 2 mg po bid. In the ED, labs shows BNP 4900, troponin 0.056, INR 5.1, Hgb 8.0. Patient is poor historian. Denies rectal bleed or hematemesis. Baseline creatinine 1.9 in 2012, currently 2.2. Admitted for CHF exacerbation, coumadin toxicity and symptomatic anemia.    Hospital Course:  5/26/17 - Patient with improvement in her breathing s/p diuresis. Patient denies cp / palpitations. Cardiology reports she is returning to her baseline. Plan for her to follow with her cardiologist Dr Nieves. Patient speaking in full sentences, lucid and appropriate.    Interval History: Improving steadily    Review of Systems   Constitutional: Positive for fatigue. Negative for unexpected weight change.   HENT: Negative.  Negative for trouble swallowing.    Eyes: Negative.    Respiratory: Positive for shortness of breath. Negative for cough, wheezing and stridor.    Cardiovascular: Negative.  Negative for chest pain.   Gastrointestinal: Negative.    Endocrine: Negative.    Genitourinary: Negative.    Musculoskeletal: Negative.    Skin: Negative.    Allergic/Immunologic: Negative.    Neurological: Positive for weakness. Negative for dizziness.   Hematological: Negative.    Psychiatric/Behavioral: Negative.    All other systems reviewed and are negative.    Objective:     Vital Signs (Most Recent):  Temp:  98.7 °F (37.1 °C) (05/25/17 0702)  Pulse: 97 (05/25/17 0947)  Resp: 19 (05/25/17 0947)  BP: 133/69 (05/25/17 0947)  SpO2: 98 % (05/25/17 0947) Vital Signs (24h Range):  Temp:  [98.4 °F (36.9 °C)-98.7 °F (37.1 °C)] 98.7 °F (37.1 °C)  Pulse:  [76-99] 97  Resp:  [14-19] 19  SpO2:  [97 %-99 %] 98 %  BP: (125-154)/(45-83) 133/69     Weight: 54.4 kg (120 lb)  Body mass index is 21.95 kg/m².    Intake/Output Summary (Last 24 hours) at 05/25/17 1040  Last data filed at 05/25/17 0900   Gross per 24 hour   Intake                0 ml   Output             1225 ml   Net            -1225 ml      Physical Exam   Constitutional: She is oriented to person, place, and time. She appears well-developed and well-nourished.   HENT:   Head: Normocephalic and atraumatic.   Eyes: EOM are normal. Pupils are equal, round, and reactive to light.   Neck: Normal range of motion. Neck supple. No JVD present.   Cardiovascular: Normal rate, regular rhythm and intact distal pulses.    Murmur heard.  Pulmonary/Chest: Effort normal. No respiratory distress. She has wheezes.   Abdominal: Soft. Bowel sounds are normal. She exhibits no distension.   Musculoskeletal: Normal range of motion. She exhibits no edema or tenderness.   Neurological: She is alert and oriented to person, place, and time. She has normal reflexes. No cranial nerve deficit.   Skin: Skin is warm and dry. Capillary refill takes less than 2 seconds. No erythema.   Psychiatric: She has a normal mood and affect. Her behavior is normal. Judgment and thought content normal.   Nursing note and vitals reviewed.      Significant Labs:   Recent Lab Results       05/25/17  0907 05/25/17  0755 05/25/17  0555 05/25/17  0450 05/24/17  2233      Acanthocytes    Present      Albumin   2.8(L)  3.0(L)     Alkaline Phosphatase   91  87     ALT   11  15     Anion Gap   9  12     Aniso    Moderate      AST   36  44(H)     Baso #    0.01 0.01     Basophil%    0.2 0.2     Total Bilirubin    0.8  Comment:  For infants and newborns, interpretation of results should be based  on gestational age, weight and in agreement with clinical  observations.  Premature Infant recommended reference ranges:  Up to 24 hours.............<8.0 mg/dL  Up to 48 hours............<12.0 mg/dL  3-5 days..................<15.0 mg/dL  6-29 days.................<15.0 mg/dL    0.9  Comment:  For infants and newborns, interpretation of results should be based  on gestational age, weight and in agreement with clinical  observations.  Premature Infant recommended reference ranges:  Up to 24 hours.............<8.0 mg/dL  Up to 48 hours............<12.0 mg/dL  3-5 days..................<15.0 mg/dL  6-29 days.................<15.0 mg/dL       BNP     4,618  Comment:  Values of less than 100 pg/ml are consistent with non-CHF populations.(H)     BUN, Bld   61(H)  62(H)     [place] Cells    Moderate      Calcium   8.4(L)  8.6(L)     Chloride   105  103     CO2   25  25     Creatinine   2.1(H)  2.2(H)     Differential Method    Automated Automated     eGFR if    26(A)  24(A)     eGFR if non    22  Comment:  Calculation used to obtain the estimated glomerular filtration  rate (eGFR) is the CKD-EPI equation. Since race is unknown   in our information system, the eGFR values for   -American and Non--American patients are given   for each creatinine result.  (A)  21  Comment:  Calculation used to obtain the estimated glomerular filtration  rate (eGFR) is the CKD-EPI equation. Since race is unknown   in our information system, the eGFR values for   -American and Non--American patients are given   for each creatinine result.  (A)     Eos #    0.2 0.1     Eosinophil%    3.6 3.2     Glucose   235(H)  153(H)     Gran #    2.4 2.2     Gran%    54.6 49.4     Hematocrit    21.5(L) 24.2(L)     Hemoglobin    7.1(L) 8.0(L)     Coumadin Monitoring INR    5.3  Comment:  Coumadin Therapy:  2.0 - 3.0 for  INR for all indicators except mechanical heart valves  and antiphospholipid syndromes which should use 2.5 - 3.5.  critical result(s) called and verbal readback obtained from   INR 5.3 CINTHYA BAZAN RN, 05/25/2017 05:46  (HH) 5.1  Comment:  Coumadin Therapy:  2.0 - 3.0 for INR for all indicators except mechanical heart valves  and antiphospholipid syndromes which should use 2.5 - 3.5.  critical result(s) called and verbal readback obtained from   INR 5.1 CINTHYA BAZAN RN, 05/24/2017 23:07  (HH)     Lymph #    0.9(L) 1.0     Lymph%    20.8 22.7     Magnesium   1.9       MCH    26.0(L) 26.1(L)     MCHC    33.0 33.1     MCV    79(L) 79(L)     Mono #    0.9 1.1(H)     Mono%    21.0(H) 24.5(H)     MPV    SEE COMMENT  Comment:  Result not available. SEE COMMENT  Comment:  Result not available.     Phosphorus   3.2       Platelets    168 156     POCT Glucose 252(H) 230(H)        Poik    Moderate      Potassium   3.3(L)  3.8     Total Protein   6.1  6.6     Protime    51.5(H) 49.6(H)     RBC    2.73(L) 3.07(L)     RDW    25.7(H) 25.2(H)     Schistocytes    Present      Sodium   139  140     Spherocytes    Occasional      Stomatocytes    Present      Target Cells    Moderate      Tear Drop Cells    Occasional      Troponin I   0.051  Comment:  The reference interval for Troponin I represents the 99th percentile   cutoff   for our facility and is consistent with 3rd generation assay   performance.  (H)  0.056  Comment:  The reference interval for Troponin I represents the 99th percentile   cutoff   for our facility and is consistent with 3rd generation assay   performance.  (H)     WBC    4.42 4.37                     Significant Imaging: I have reviewed and interpreted all pertinent imaging results/findings within the past 24 hours.    Assessment/Plan:      CKD (chronic kidney disease), stage V    - Creatinine 1.9 five years ago, currently 1.5   - Avoid nephrotoxins  - Electrolytes stable, no indication for  hemodialysis          Symptomatic anemia    - Hgb 7.1 in setting of INR 5.3 (coumadin toxicity) and worsening SOB/LEIGH due to CHF exacerbation  - Check stool for blood  - Transfuse PRBC when Hgb < 7.0          Coumadin toxicity    - On coumadin for h/o VTE  - Hold warfarin, monitor H/H - INR 5.3 today  - No evidence of bleeding at this time          Type 2 diabetes mellitus with diabetic chronic kidney disease    - ISS  - Check HbA1C  - Levemir          * Acute on chronic congestive heart failure    - No prior echo on record  - Check Echo in AM  - Takes bumex 2 mg po bid at home  - Start Lasix 40 mg IB BID  - Monitor renal function and UOP    5/25/17: overall she is feeling better and had about 1200 output. Echo is pending as she doesn't have a previous one her as her Cardiologist is Dr. Nieves.    5/26/17 Patient continues to improve. Plan for Dobutamine Gtt cessation          VTE Risk Mitigation         Ordered     Place sequential compression device  Until discontinued      05/25/17 0305     Medium Risk of VTE  Once      05/25/17 0058          John Johns MD  Department of Hospital Medicine   [facility]

## 2017-05-26 NOTE — PLAN OF CARE
Problem: Patient Care Overview  Goal: Plan of Care Review  Outcome: Ongoing (interventions implemented as appropriate)  Pt participating in plan of care, dobutamine gtt continued, normal sinus rhythm, PICC line remain in place, patent, possible d/c tomorrow.

## 2017-05-26 NOTE — ED NOTES
Patient unable to respond, just moaning. Unable to answer questions and disoriented. Blood sugar checked, registering less than 29. Administered 50% dextrose IV and when patient started to respond had patient drink apple juice. Call placed to hospital medicine MD.

## 2017-05-26 NOTE — NURSING
Pt. Transferred via bed and IV pole; dobutamine infusing at 9.6 ml/hr. Telemetry monitoring initiated; Will continue to monitor.

## 2017-05-26 NOTE — PLAN OF CARE
05/26/17 1554   Medicare Message   Important Message from Medicare regarding Discharge Appeal Rights Given to patient/caregiver;Explained to patient/caregiver;Signed/date by patient/caregiver   Date IMM was signed 05/26/17   Time IMM was signed 0807

## 2017-05-26 NOTE — PLAN OF CARE
"Met with patient at bedside.  Patient reports that she "overdid it on Mother's Day and now has some extra fluid to remove."  Patient states that she is typically adherent with her diet and is adherent with all medications.  Patient indicates being independent with ADLs; was receiving no HH services prior to admission; and anticipates having no needs from a case management perspective post-hospitalization.  Provided patient with this 's contact information and requested that she contact me or [facility] weekend  should any needs arise.      PLAN:  Continue to follow       05/26/17 2573   Discharge Assessment   Assessment Type Discharge Planning Assessment   Confirmed/corrected address and phone number on facesheet? Yes   Assessment information obtained from? Patient;Medical Record   Expected Length of Stay (days) (TBD)   Communicated expected length of stay with patient/caregiver no   Type of Healthcare Directive Received Durable power of  for health care;Living will  (Patient states that she has one and will bring from home; Patient indicates her daughter, Lillian Dover, is POA )   Prior to hospitilization cognitive status: Alert/Oriented   Prior to hospitalization functional status: Assistive Equipment   Current cognitive status: Alert/Oriented   Current Functional Status: Assistive Equipment   Arrived From admitted as an inpatient;home or self-care   Able to Return to Prior Arrangements yes   Is patient able to care for self after discharge? Yes   How many people do you have in your home that can help with your care after discharge? 1   Who are your caregiver(s) and their phone number(s)? Lillian Field, Daughter: [phone]   Patient's perception of discharge disposition home or selfcare   Readmission Within The Last 30 Days no previous admission in last 30 days   Patient currently being followed by outpatient case management? No   Patient currently receives home health " services? No   Does the patient currently use HME? Yes   Name and contact number for HME provider: Unable to recall   Patient currently receives private duty nursing? No   Patient currently receives any other outside agency services? No   Equipment Currently Used at Home rollator;cane, straight;bedside commode;wheelchair   Do you have any problems affording any of your prescribed medications? No   Is the patient taking medications as prescribed? yes   Do you have any financial concerns preventing you from receiving the healthcare you need? No   Does the patient have transportation to healthcare appointments? Yes   Transportation Available family or friend will provide   On Dialysis? No   Discharge Plan A Home with family   Discharge Plan B Home with family   Patient/Family In Agreement With Plan yes

## 2017-05-27 VITALS
WEIGHT: 120 LBS | TEMPERATURE: 98 F | HEIGHT: 62 IN | DIASTOLIC BLOOD PRESSURE: 69 MMHG | OXYGEN SATURATION: 95 % | RESPIRATION RATE: 20 BRPM | SYSTOLIC BLOOD PRESSURE: 121 MMHG | HEART RATE: 94 BPM | BODY MASS INDEX: 22.08 KG/M2

## 2017-05-27 PROBLEM — D64.9 SYMPTOMATIC ANEMIA: Status: RESOLVED | Noted: 2017-05-25 | Resolved: 2017-05-27

## 2017-05-27 PROBLEM — T45.511A COUMADIN TOXICITY: Status: RESOLVED | Noted: 2017-05-25 | Resolved: 2017-05-27

## 2017-05-27 PROBLEM — I50.9 ACUTE ON CHRONIC CONGESTIVE HEART FAILURE, UNSPECIFIED CONGESTIVE HEART FAILURE TYPE: Status: ACTIVE | Noted: 2017-05-27

## 2017-05-27 LAB
ACANTHOCYTES BLD QL SMEAR: PRESENT
ANISOCYTOSIS BLD QL SMEAR: ABNORMAL
BASOPHILS # BLD AUTO: 0.01 K/UL
BASOPHILS NFR BLD: 0.2 %
BURR CELLS BLD QL SMEAR: ABNORMAL
DACRYOCYTES BLD QL SMEAR: ABNORMAL
DIFFERENTIAL METHOD: ABNORMAL
EOSINOPHIL # BLD AUTO: 0.1 K/UL
EOSINOPHIL NFR BLD: 2.6 %
ERYTHROCYTE [DISTWIDTH] IN BLOOD BY AUTOMATED COUNT: 25.8 %
HCT VFR BLD AUTO: 24.7 %
HGB BLD-MCNC: 8.1 G/DL
HYPOCHROMIA BLD QL SMEAR: ABNORMAL
INR PPP: 2.4
LYMPHOCYTES # BLD AUTO: 0.8 K/UL
LYMPHOCYTES NFR BLD: 15.5 %
MCH RBC QN AUTO: 26.5 PG
MCHC RBC AUTO-ENTMCNC: 32.8 %
MCV RBC AUTO: 81 FL
MONOCYTES # BLD AUTO: 0.9 K/UL
MONOCYTES NFR BLD: 16 %
NEUTROPHILS # BLD AUTO: 3.5 K/UL
NEUTROPHILS NFR BLD: 66.1 %
OVALOCYTES BLD QL SMEAR: ABNORMAL
PLATELET # BLD AUTO: 172 K/UL
PLATELET BLD QL SMEAR: ABNORMAL
PMV BLD AUTO: ABNORMAL FL
POCT GLUCOSE: 108 MG/DL (ref 70–110)
POCT GLUCOSE: 136 MG/DL (ref 70–110)
POIKILOCYTOSIS BLD QL SMEAR: ABNORMAL
POLYCHROMASIA BLD QL SMEAR: ABNORMAL
PROTHROMBIN TIME: 24.2 SEC
RBC # BLD AUTO: 3.06 M/UL
SCHISTOCYTES BLD QL SMEAR: PRESENT
SPHEROCYTES BLD QL SMEAR: ABNORMAL
STOMATOCYTES BLD QL SMEAR: PRESENT
TARGETS BLD QL SMEAR: ABNORMAL
WBC # BLD AUTO: 5.37 K/UL

## 2017-05-27 PROCEDURE — 85025 COMPLETE CBC W/AUTO DIFF WBC: CPT

## 2017-05-27 PROCEDURE — 25000003 PHARM REV CODE 250: Performed by: EMERGENCY MEDICINE

## 2017-05-27 PROCEDURE — 99232 SBSQ HOSP IP/OBS MODERATE 35: CPT | Mod: ,,, | Performed by: INTERNAL MEDICINE

## 2017-05-27 PROCEDURE — 85610 PROTHROMBIN TIME: CPT

## 2017-05-27 PROCEDURE — 25000003 PHARM REV CODE 250: Performed by: INTERNAL MEDICINE

## 2017-05-27 PROCEDURE — 63600175 PHARM REV CODE 636 W HCPCS: Performed by: EMERGENCY MEDICINE

## 2017-05-27 RX ORDER — ALLOPURINOL 100 MG/1
100 TABLET ORAL DAILY
Status: DISCONTINUED | OUTPATIENT
Start: 2017-05-27 | End: 2017-05-27 | Stop reason: HOSPADM

## 2017-05-27 RX ADMIN — LEFLUNOMIDE 20 MG: 20 TABLET, FILM COATED ORAL at 10:05

## 2017-05-27 RX ADMIN — METOPROLOL SUCCINATE 12.5 MG: 25 TABLET, EXTENDED RELEASE ORAL at 10:05

## 2017-05-27 RX ADMIN — SODIUM CHLORIDE, PRESERVATIVE FREE 3 ML: 5 INJECTION INTRAVENOUS at 06:05

## 2017-05-27 RX ADMIN — FUROSEMIDE 40 MG: 10 INJECTION, SOLUTION INTRAMUSCULAR; INTRAVENOUS at 10:05

## 2017-05-27 RX ADMIN — ATORVASTATIN CALCIUM 10 MG: 10 TABLET, FILM COATED ORAL at 10:05

## 2017-05-27 RX ADMIN — ALLOPURINOL 100 MG: 100 TABLET ORAL at 03:05

## 2017-05-27 RX ADMIN — PANTOPRAZOLE SODIUM 40 MG: 40 TABLET, DELAYED RELEASE ORAL at 10:05

## 2017-05-27 NOTE — PROGRESS NOTES
This RN spoke with daughter and informed her of importance to make apt for pt to follow up with cardiology and PCP, also to have coumadin levels checked by cardiologist.  Daughter verbalized understanding.

## 2017-05-27 NOTE — PROGRESS NOTES
Discharge orders received, orders reviewed with patient and family, pt instructed when to take each medicine next. Pt informed of follow up appointments that need to be made and importance of monitoring coumadin levels.  PIV out, tele off, pt dressed self in own clothing.  PICC intact and infusing dobutamine gtt per own home pump set up by daughter. Pt and daughter verbalized understanding and does not have any questions at this time.  Pt escorted to lobby via wheelchair via staff.  Pt personal belongings with pt and family.

## 2017-05-27 NOTE — PROGRESS NOTES
This RN spoke with pt daughter Lillian at [phone] about going home with dobutamine gtt.  Lillian stated that [facility] comes to the house to assist the pt with her PICC and [facility] supplies the dobutamine gtt medication. Daughter notified of pt discharge orders and need to come get her and to bring pump with gtt. Daughter verbalized understanding.

## 2017-05-27 NOTE — PROGRESS NOTES
1300- Pt daughter notified this RN that pt is on dobutamine gtt at home.  MD Bean and MADELINE Martinez notified. Cardiology to see pt and sign off.    1530- MD Andreas notified of awaiting final say from cardiology about dobutamine gtt at home.  MD Andreas spoke with MD Ricky and stated pt is to continue home routine of dobutamine gtt.

## 2017-05-27 NOTE — PROGRESS NOTES
Call received from on-call nurse,Ricardo @ [facility]  inquiring about patient's discharge.  [facility] advised Ricardo that it appeared that patient had been discharged.  Ricardo asked that [facility] fax discharge order and HH orders.  [facility] faxed demographics, AVS, discharge summary, and ambulatory referral to home health to [phone].  Lisa Thurston LMSW, [facility], [facility]  05/27/2017

## 2017-05-27 NOTE — PROGRESS NOTES
MD Ricky notified via secure chat of pt 12 beat run of vtach, VSS and pt asymptomatic.  MD acknowledged order.  Will continue to monitor.

## 2017-05-27 NOTE — PROGRESS NOTES
Cardiology Progress Note        SUBJECTIVE:     History of Present Illness:  PMHx of Systolic Heart Failure, DM, CKD and HTN. She presents to [facility] Emergency Room with shortness of breath. Associated symptoms include orthopnea, chest pain and LEIGH. Symptoms started about 2 weeks, after mother's days, and having getting worse. Primary Cardiologist is Dr Nieves. BNP 4618. Troponin 0.051>0.061.    Had ten beats of nonsustained VT today. Asymptomatic.      OBJECTIVE:     Vital Signs (Most Recent)  Temp: 97.6 °F (36.4 °C) (05/27/17 1200)  Pulse: 94 (05/27/17 1400)  Resp: 20 (05/27/17 1400)  BP: (!) 125/58 (05/27/17 1400)  SpO2: 95 % (05/27/17 1400)    Vital Signs Range (Last 24H):  Temp:  [97.6 °F (36.4 °C)-99.1 °F (37.3 °C)]   Pulse:  []   Resp:  [16-20]   BP: (125-143)/(58-79)   SpO2:  [95 %-97 %]     Intake/Output last 3 shifts:  I/O last 3 completed shifts:  In: 1331.5 [P.O.:1120; I.V.:211.5]  Out: 1350 [Urine:1350]    Intake/Output this shift:  No intake/output data recorded.    Review of patient's allergies indicates:   Allergen Reactions    Fish containing products Hives    Percocet [oxycodone-acetaminophen] Hives and Rash    Shellfish containing products Hives    Darvocet a500  [propoxyphene n-acetaminophen]      Other reaction(s): Rash    Ibuprofen Hives     Other reaction(s): Hives    Iodine      Other reaction(s): rash    Lanolin      Other reaction(s): rash    Latex Hives     Other reaction(s): rash    Milk containing products     Other      fragrance    Saxagliptin Other (See Comments)     Lowers calcium level     Penicillins Rash     SEVERE FACIAL SWELLING, EYES SWELLED SHUT.    Propoxyphene-acetaminophen Rash    Red dye Rash       Current Facility-Administered Medications   Medication    albuterol-ipratropium 2.5mg-0.5mg/3mL nebulizer solution 3 mL    allopurinol tablet 100 mg    aluminum-magnesium hydroxide-simethicone 200-200-20 mg/5 mL suspension 30 mL    atorvastatin tablet 10  "mg    dextrose 50% injection 12.5 g    dextrose 50% injection 25 g    DOBUTamine 500mg in D5W 250mL infusion (premix) (NON-TITRATING)    furosemide injection 40 mg    glucagon (human recombinant) injection 1 mg    glucose chewable tablet 16 g    glucose chewable tablet 24 g    hydrALAZINE injection 10 mg    insulin detemir pen 20 Units    latanoprost 0.005 % ophthalmic solution 1 drop    leflunomide tablet 20 mg    metoprolol succinate (TOPROL-XL) 24 hr split tablet 12.5 mg    ondansetron injection 4 mg    pantoprazole EC tablet 40 mg    sodium chloride 0.9% injection 3 mL     No current facility-administered medications on file prior to encounter.      Current Outpatient Prescriptions on File Prior to Encounter   Medication Sig    allopurinol (ZYLOPRIM) 100 MG tablet Take 100 mg by mouth 2 (two) times daily.    ascorbic acid (VITAMIN C) 500 MG tablet Take 1 tablet by mouth Daily.    benzonatate (TESSALON) 100 MG capsule Take 100 mg by mouth once daily.    blood sugar diagnostic (BLOOD GLUCOSE TEST) Strp by Misc.(Non-Drug; Combo Route) route 3 (three) times daily. ONE TOUCH TRUE STRIPS    blood-glucose meter Misc Use to take blood sugar    bumetanide (BUMEX) 1 MG tablet Take 2 mg by mouth 2 (two) times daily.    calcium carbonate (OS-ALEX) 600 mg (1,500 mg) Tab Take 600 mg by mouth.    DEXILANT 60 mg capsule     insulin detemir (LEVEMIR) 100 unit/mL injection Inject into the skin. 10-15 in the am    insulin detemir (LEVEMIR) 100 unit/mL injection Inject 24 Units into the skin.    insulin NPH-insulin regular (NOVOLIN 70/30) 100 unit/mL (70-30) injection Inject into the skin. 10-12 units over 400    insulin syringe-needle U-100 1 mL 31 x 5/16" Syrg     iron-vitamin C (FE C) 100-250 mg Tab Take 1 tablet by mouth Daily.    lancets (ACCU-CHEK SOFTCLIX LANCETS) Misc Stick finger to apply blood to test strip to check blood sugar 3 times daily.    latanoprost 0.005 % ophthalmic solution INSTILL " 1 DROP INTOP BOTH EYES EVERY NIGHT AT BEDTIME    leflunomide (ARAVA) 20 MG tablet Take 1 tablet by mouth Daily. generic    metoprolol succinate (TOPROL-XL) 25 MG 24 hr tablet Take 12.5 mg by mouth 2 (two) times daily.     multivitamin with minerals tablet Take 1 tablet by mouth.    warfarin (COUMADIN) 5 MG tablet Take 2.5 tablets by mouth every evening.     [DISCONTINUED] hydrocodone-acetaminophen (LORTAB)  mg per tablet Take by mouth Twice daily.    [DISCONTINUED] hydrocodone-acetaminophen 7.5-325mg (NORCO) 7.5-325 mg per tablet Take 1 tablet by mouth every 6 (six) hours as needed for Pain.    fish oil-omega-3 fatty acids 300-1,000 mg capsule Take 1 g by mouth.    furosemide (LASIX) 40 MG tablet Take 1 tablet by mouth Daily.    isosorbide-hydrALAZINE 20-37.5 mg (BIDIL) 20-37.5 mg Tab Take 1 tablet by mouth once daily.    KLOR-CON 10 10 mEq TbSR     rosuvastatin (CRESTOR) 10 MG tablet Take 1 tablet by mouth Daily.    tramadol (ULTRAM) 50 mg tablet     TRUERESULT BLOOD GLUCOSE SYSTM kit     ZOVIRAX 5 % Crea     [DISCONTINUED] ciprofloxacin HCl (CIPRO) 500 MG tablet     [DISCONTINUED] clindamycin (CLEOCIN) 300 MG capsule     [DISCONTINUED] hydrocodone-acetaminophen 5-325mg (NORCO) 5-325 mg per tablet        Physical Exam:  General appearance: alert, appears stated age and cooperative  Head: Normocephalic, without obvious abnormality, atraumatic  Eyes:  conjunctivae/corneas clear. PERRL, EOM's intact. Fundi benign.  Nose: no discharge  Throat: normal findings: tongue midline and normal  Neck: no adenopathy, no carotid bruit, no JVD, supple, symmetrical, trachea midline and thyroid not enlarged, symmetric, no tenderness/mass/nodules  Back:  no skin lesions, erythema, or scars  Lungs:  clear to auscultation bilaterally  Chest wall: no tenderness  Heart: regular rate and rhythm, S1, S2 normal, no murmur, click, rub or gallop  Abdomen: soft, non-tender; bowel sounds normal; no masses,  no  organomegaly  Extremities: extremities normal, atraumatic, no cyanosis or edema  Pulses: 2+ and symmetric  Skin: Skin color, texture, turgor normal. No rashes or lesions  Neurologic: Grossly normal    Laboratory:  Chemistry:   Lab Results   Component Value Date     05/26/2017    K 3.3 (L) 05/26/2017     05/26/2017    CO2 29 05/26/2017    BUN 50 (H) 05/26/2017    CREATININE 1.5 (H) 05/26/2017    CALCIUM 9.0 05/26/2017     Cardiac Markers:   Lab Results   Component Value Date    TROPONINI 0.061 (H) 05/25/2017     Cardiac Markers (Last 3):   Lab Results   Component Value Date    TROPONINI 0.061 (H) 05/25/2017    TROPONINI 0.051 (H) 05/25/2017    TROPONINI 0.051 (H) 05/25/2017     CBC:   Lab Results   Component Value Date    WBC 5.37 05/27/2017    HGB 8.1 (L) 05/27/2017    HCT 24.7 (L) 05/27/2017    MCV 81 (L) 05/27/2017     05/27/2017     Lipids: No results found for: CHOL, TRIG, HDL, LDLDIRECT  Coagulation:   Lab Results   Component Value Date    INR 2.4 (H) 05/27/2017       Diagnostic Results:  ECG: Reviewed  X-Ray: Reviewed  US: Reviewed  CT: Reviewed  Echo: Reviewed      ASSESSMENT/PLAN:     Patient Active Problem List   Diagnosis    Diabetes mellitus    Hypertension    Primary open-angle glaucoma    Pseudophakia    Epiretinal membrane    Severe stage glaucoma    Type 2 diabetes mellitus with diabetic chronic kidney disease    Acute on chronic congestive heart failure    Symptomatic anemia    CKD (chronic kidney disease), stage V    Acute on chronic congestive heart failure, unspecified congestive heart failure type        Plan: ok to continue dobutamine gtt,  continue BB, statin, lasix.  Add ACEI once kidney function is stable.

## 2017-05-27 NOTE — DISCHARGE SUMMARY
[facility] Hospital Medicine  Discharge Summary      Patient Name: Vita Field  MRN: [id number]  Admission Date: 5/24/2017  Hospital Length of Stay: 3 days  Discharge Date and Time:  05/27/2017 1:21 PM  Attending Physician: John Johns MD   Discharging Provider: Aakash Del Angel MD  Primary Care Provider: Tabatha Jacques MD      HPI:   Ms. 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 She denies chest pain, but c/o bilateral lower extremity in spite of taking bumex 2 mg po bid. In the ED, labs shows BNP 4900, troponin 0.056, INR 5.1, Hgb 8.0. Patient is poor historian. Denies rectal bleed or hematemesis. Baseline creatinine 1.9 in 2012, currently 2.2. Admitted for CHF exacerbation, coumadin toxicity and symptomatic anemia.    * No surgery found *      Indwelling Lines/Drains at time of discharge:   Lines/Drains/Airways     Peripherally Inserted Central Catheter Line                 PICC Double Lumen 03/23/17 0045 right basilic 65 days              Hospital Course:   Placed on diuresis and documented for negative fluid balance.  Troponin leak noted probably from demand ischemia.  Cardiology consulted.  Agreed for plan and for Dobutamine drip.  Breathing status improved.  Remained w/o chest pain and dyspnea resolved.  Remained hemodynamically stable.  Noted for supratherapeutic INR.  Coumadin held and CBC trended and remained at baseline w/o evidence of bleeding.  Cardiology reports she is returning to her baseline.  CXR w/ findings suggestive of worsening changes of congestive heart failure. EKG: Normal Sinus Rhythm with T wave abnormally. She had 6 beat run of V Tach on the cardiac morning and was asymptomatic. No reports of AICD firing.  TTE w/ Severely depressed left ventricular systolic function, valvulopathy, and pHTN.  Deemed stable for discharge  with outpatient follow up with home health aid.  Plan for her to follow with her cardiologist Dr Nieves. Patient speaking in full sentences, lucid and appropriate.     Consults:   Consults         Status Ordering Provider     Inpatient consult to Cardiology  Once     Provider:  Bk García MD    Completed MARIAH KINCAID          Significant Diagnostic Studies: Labs: All labs within the past 24 hours have been reviewed    Pending Diagnostic Studies:     None        Final Active Diagnoses:    Diagnosis Date Noted POA    PRINCIPAL PROBLEM:  Acute on chronic congestive heart failure [I50.9] 05/24/2017 Yes    Symptomatic anemia [D64.9] 05/25/2017 Yes    Type 2 diabetes mellitus with diabetic chronic kidney disease [E11.22] 09/22/2015 Yes    CKD (chronic kidney disease), stage V [N18.5] 05/25/2017 Yes      Problems Resolved During this Admission:    Diagnosis Date Noted Date Resolved POA    Coumadin toxicity [T45.511A] 05/25/2017 05/27/2017 Yes      No new Assessment & Plan notes have been filed under this hospital service since the last note was generated.  Service: Hospital Medicine      Discharged Condition: stable    Disposition: Home or Self Care    Follow Up:  Follow-up Information     Tabatha Jacques MD In 1 week.    Specialty:  Family Medicine  Contact information:  [address]  [place] [address] [phone]             Yves Nieves MD In 1 week.    Specialty:  Cardiology  Contact information:  [address] [place] [id number] [phone]                 Patient Instructions:     Referral to Home health   Referral Priority: Routine Referral Type: Home Health Care   Referral Reason: Specialty Services Required    Requested Specialty: Home Health Services    Number of Visits Requested: 1      Ambulatory consult to Anticoagulation Monitoring   Referral Priority: Routine Referral Type: Consultation   Referral Reason: Specialty Services Required    Requested Specialty:  "Cardiology    Number of Visits Requested: 1      Diet Cardiac     Activity as tolerated     Call MD for:  temperature >100.4     Call MD for:  persistent nausea and vomiting or diarrhea     Call MD for:  severe uncontrolled pain     Call MD for:  redness, tenderness, or signs of infection (pain, swelling, redness, odor or green/yellow discharge around incision site)     Call MD for:  difficulty breathing or increased cough     Call MD for:  severe persistent headache     Call MD for:  worsening rash     Call MD for:  persistent dizziness, light-headedness, or visual disturbances     Call MD for:  increased confusion or weakness       Medications:  Reconciled Home Medications:   Current Discharge Medication List      CONTINUE these medications which have NOT CHANGED    Details   allopurinol (ZYLOPRIM) 100 MG tablet Take 100 mg by mouth 2 (two) times daily.      ascorbic acid (VITAMIN C) 500 MG tablet Take 1 tablet by mouth Daily.      benzonatate (TESSALON) 100 MG capsule Take 100 mg by mouth once daily.      blood sugar diagnostic (BLOOD GLUCOSE TEST) Strp by Misc.(Non-Drug; Combo Route) route 3 (three) times daily. ONE TOUCH TRUE STRIPS      blood-glucose meter Misc Use to take blood sugar      bumetanide (BUMEX) 1 MG tablet Take 2 mg by mouth 2 (two) times daily.      calcium carbonate (OS-ALEX) 600 mg (1,500 mg) Tab Take 600 mg by mouth.      DEXILANT 60 mg capsule       DOBUTAMINE HCL (DOBUTAMINE IV) Inject 320 mcg/min into the vein.      !! insulin detemir (LEVEMIR) 100 unit/mL injection Inject into the skin. 10-15 in the am      !! insulin detemir (LEVEMIR) 100 unit/mL injection Inject 24 Units into the skin.      insulin NPH-insulin regular (NOVOLIN 70/30) 100 unit/mL (70-30) injection Inject into the skin. 10-12 units over 400      insulin syringe-needle U-100 1 mL 31 x 5/16" Syrg       iron-vitamin C (FE C) 100-250 mg Tab Take 1 tablet by mouth Daily.      lancets (ACCU-CHEK SOFTCLIX LANCETS) Misc Stick " finger to apply blood to test strip to check blood sugar 3 times daily.      latanoprost 0.005 % ophthalmic solution INSTILL 1 DROP INTOP BOTH EYES EVERY NIGHT AT BEDTIME  Qty: 7.5 mL, Refills: 4    Associated Diagnoses: Severe stage glaucoma; Coag (chronic open-angle glaucoma), severe stage      leflunomide (ARAVA) 20 MG tablet Take 1 tablet by mouth Daily. generic      metoprolol succinate (TOPROL-XL) 25 MG 24 hr tablet Take 12.5 mg by mouth 2 (two) times daily.       multivitamin with minerals tablet Take 1 tablet by mouth.      warfarin (COUMADIN) 5 MG tablet Take 2.5 tablets by mouth every evening.       fish oil-omega-3 fatty acids 300-1,000 mg capsule Take 1 g by mouth.      furosemide (LASIX) 40 MG tablet Take 1 tablet by mouth Daily.      isosorbide-hydrALAZINE 20-37.5 mg (BIDIL) 20-37.5 mg Tab Take 1 tablet by mouth once daily.      KLOR-CON 10 10 mEq TbSR       rosuvastatin (CRESTOR) 10 MG tablet Take 1 tablet by mouth Daily.      tramadol (ULTRAM) 50 mg tablet Refills: 1      TRUERESULT BLOOD GLUCOSE SYSTM kit       ZOVIRAX 5 % Crea        !! - Potential duplicate medications found. Please discuss with provider.      STOP taking these medications       atorvastatin (LIPITOR) 10 MG tablet Comments:   Reason for Stopping:         hydrocodone-acetaminophen (LORTAB)  mg per tablet Comments:   Reason for Stopping:         hydrocodone-acetaminophen 7.5-325mg (NORCO) 7.5-325 mg per tablet Comments:   Reason for Stopping:         ciprofloxacin HCl (CIPRO) 500 MG tablet Comments:   Reason for Stopping:         clindamycin (CLEOCIN) 300 MG capsule Comments:   Reason for Stopping:         DOBUTamine (DOBUTREX) 250 mg/250 mL (1 mg/mL) infusion Comments:   Reason for Stopping:         hydrocodone-acetaminophen 5-325mg (NORCO) 5-325 mg per tablet Comments:   Reason for Stopping:             Time spent on the discharge of patient: 30 minutes    Aakash Del Angel MD  Department of Hospital Medicine  [facility]

## 2017-05-27 NOTE — PLAN OF CARE
Problem: Patient Care Overview  Goal: Plan of Care Review  Outcome: Ongoing (interventions implemented as appropriate)  Pt. Currently resting in bed; NSR on monitor on RA.   Dobutamine gtt infusing at 9.6 ml/hr.  BG monitoring initiated. Will continue to monitor.  Safety/fall precautions maintained. Bed alarm set.

## 2017-05-29 ENCOUNTER — ANTI-COAG VISIT (OUTPATIENT)
Dept: CARDIOLOGY | Facility: CLINIC | Age: 77
End: 2017-05-29

## 2017-05-29 DIAGNOSIS — I50.9 ACUTE ON CHRONIC CONGESTIVE HEART FAILURE, UNSPECIFIED CONGESTIVE HEART FAILURE TYPE: ICD-10-CM

## 2017-05-29 NOTE — PROGRESS NOTES
LVM on daughter's cell to verify that patient has someone to monitor warfarin outside of [facility]. Patient was referred to [facility] at discharge, however, does not meet enrollment criteria [facility] Per discharge summaries, patient/family was advised of the importance of getting in touch with PCP/Cardiologist to monitor INR levels. I would like to verify that this was done before cancelling enrollment.     I called HH for more information and they are starting that patient is not enrolled for insurance reasons.

## 2017-06-01 ENCOUNTER — ANTI-COAG VISIT (OUTPATIENT)
Dept: CARDIOLOGY | Facility: CLINIC | Age: 77
End: 2017-06-01

## 2017-07-03 ENCOUNTER — OFFICE VISIT (OUTPATIENT)
Dept: OPHTHALMOLOGY | Facility: CLINIC | Age: 77
End: 2017-07-03
Payer: MEDICARE

## 2017-07-03 DIAGNOSIS — H40.9 SEVERE STAGE GLAUCOMA: ICD-10-CM

## 2017-07-03 DIAGNOSIS — Z79.4 TYPE 2 DIABETES MELLITUS WITHOUT COMPLICATION, WITH LONG-TERM CURRENT USE OF INSULIN: ICD-10-CM

## 2017-07-03 DIAGNOSIS — H04.129 DRYNESS, EYE: ICD-10-CM

## 2017-07-03 DIAGNOSIS — E86.1 HYPOVOLEMIA: Primary | ICD-10-CM

## 2017-07-03 DIAGNOSIS — E11.9 TYPE 2 DIABETES MELLITUS WITHOUT COMPLICATION, WITH LONG-TERM CURRENT USE OF INSULIN: ICD-10-CM

## 2017-07-03 PROCEDURE — 99999 PR PBB SHADOW E&M-EST. PATIENT-LVL II: CPT | Mod: PBBFAC,,, | Performed by: OPHTHALMOLOGY

## 2017-07-03 PROCEDURE — 92014 COMPRE OPH EXAM EST PT 1/>: CPT | Mod: S$GLB,,, | Performed by: OPHTHALMOLOGY

## 2017-07-03 RX ORDER — LATANOPROST 50 UG/ML
1 SOLUTION/ DROPS OPHTHALMIC NIGHTLY
Qty: 2.5 ML | Refills: 4 | Status: SHIPPED | OUTPATIENT
Start: 2017-07-03 | End: 2018-07-03

## 2017-07-03 NOTE — PROGRESS NOTES
l    ===============================  07/03/2017   Vita Field,   77 y.o. female   Last visit [facility]: :9/22/2015   Last visit eye dept. Visit date not found  [facility]:  Corrected distance visual acuity was 20/40 -2 in the right eye and 20/40 -2 in the left eye.  Tonometry     Tonometry (Applanation, 1:24 PM)       Right Left    Pressure 13 13    .fluorescein              Wearing Rx     Wearing Rx       Sphere Cylinder Axis Add    Right +0.25 +1.00 013 +2.50    Left -1.00 +2.75 175 +2.50    Age:  1yr    Type:  Bifocal               Not recorded        Chief Complaint   Patient presents with    Eye Problem     burning and itching OU yesterday; woke up with vision loss, slowly returning     Blurred Vision     OD>OS, vision loss yesterday, slowly returning     Spots and/or Floaters     flashes and floaters    Glaucoma     Ophthalmic Medications     Ophthalmic-Intraocular Pressure Reducing Agents, Prostaglandin Analogs Start End    latanoprost 0.005 % ophthalmic solution 7/3/2017 7/3/2018    Sig: Place 1 drop into both eyes every evening.    Class: Print    Route: Both Eyes    latanoprost 0.005 % ophthalmic solution (Discontinued) 11/28/2016 7/3/2017    Sig: INSTILL 1 DROP INTOP BOTH EYES EVERY NIGHT AT BEDTIME    Reason for Discontinue: Reorder         HPI     Eye Problem    Additional comments: burning and itching OU yesterday; woke up with   vision loss, slowly returning            Blurred Vision    Additional comments: OD>OS, vision loss yesterday, slowly returning            Spots and/or Floaters    Additional comments: flashes and floaters           Comments   Pt was last seen 1/12/16 by mgm; [facility] 8/2/10. Missed f/u in March. Pt's   daughter, Lillian, is with her today; states that yesterday morning pt woke   up with burning, itching feeling OU. Unable to see OU. Went to [facility] ER   yesterday and was told to see [facility]. Pt states that her vision is gradually   returning, more so OS>OD. C/o floaters and flashes OU. Pt's  "daughter   states that she is currently not using any of her Glaucoma gtts.   DM for 31 years   PDR  Bilateral ERM  PCIOL OD  PCIOL OS with CP 12-29-14 +23.0 SN60WF CDE 34.82 TDW 1000 Excellent Flow   Lot # [id number]  Suture Trimmed 01/16/15  COAG--lost to follow up, last visit 7-9-12 missed 4 mth rtc with vf DR CALDERON  Tmax 28/24  cct 495 (+4)  Latanoprost QHS OU ?       Last edited by Judy Gallagher on 7/3/2017  1:22 PM. (History)      Read Studies:   Vitals  ________________  7/3/2017  Problem List Items Addressed This Visit        Eye/Vision problems    Severe stage glaucoma  Resume latanoprost    Relevant Medications    latanoprost 0.005 % ophthalmic solution      Other Visit Diagnoses     Hypovolemia    -  Primary - + chf, + renal failure    Dryness, eye        Relevant Medications    peg 400-propylene glycol, PF, (SYSTANE ULTRA, PF,) 0.4-0.3 % Dpet    Type 2 diabetes mellitus without complication, with long-term current use of insulin            Awakened yesterday, OU itchy, uncomfortable, and dark  But was able to walk to breakfast on her own  Then a few hours later could not count fingers in front of her face  Went to ER  In ER HM, then able to read Doctors badge.  CT negative  Glucose 120  Was not dizzy or weak    + CHF + renal failure  Recent chf crisis  UTI for 2 months per daughter, added antibiotic at ER again yesterday for + UTI    Clear view in, pale disc (as before), no active retinopathy  OD 2+ spk, OS 2+ spk to mid pupil  No change in exam    "BP has been low, take vitals every morning.  Yesterday 108/58"    Could be hypovolemic mal perfusion  See PCP to assess systemic hemodynamics    Recommend AT's frequently - QID or more, Preservative free systane  Resume Latanoprost OU qhs  See Dr. Meléndez for work up (see above)    rtc with me in 1 month to recheck  .       ===========================  "

## 2018-06-13 ENCOUNTER — TELEPHONE (OUTPATIENT)
Dept: OPHTHALMOLOGY | Facility: CLINIC | Age: 78
End: 2018-06-13

## 2018-06-13 NOTE — TELEPHONE ENCOUNTER
Pt is overdue to see Dr Owusu for eye examination   I attempted to call both numbers, no answer